# Patient Record
Sex: FEMALE | NOT HISPANIC OR LATINO | URBAN - METROPOLITAN AREA
[De-identification: names, ages, dates, MRNs, and addresses within clinical notes are randomized per-mention and may not be internally consistent; named-entity substitution may affect disease eponyms.]

---

## 2024-07-05 RX ORDER — AMOXICILLIN 500 MG/1
500 CAPSULE ORAL EVERY 12 HOURS
Qty: 20 CAPSULE | Refills: 0 | Status: SHIPPED | OUTPATIENT
Start: 2024-07-05 | End: 2024-07-15

## 2025-01-30 ENCOUNTER — OFFICE VISIT (OUTPATIENT)
Dept: SPORTS MEDICINE | Facility: CLINIC | Age: 35
End: 2025-01-30
Payer: COMMERCIAL

## 2025-01-30 ENCOUNTER — HOSPITAL ENCOUNTER (OUTPATIENT)
Dept: RADIOLOGY | Facility: HOSPITAL | Age: 35
Discharge: HOME | End: 2025-01-30
Attending: STUDENT IN AN ORGANIZED HEALTH CARE EDUCATION/TRAINING PROGRAM
Payer: COMMERCIAL

## 2025-01-30 VITALS — DIASTOLIC BLOOD PRESSURE: 86 MMHG | WEIGHT: 205.38 LBS | SYSTOLIC BLOOD PRESSURE: 122 MMHG | HEART RATE: 84 BPM

## 2025-01-30 DIAGNOSIS — M25.561 RIGHT KNEE PAIN, UNSPECIFIED CHRONICITY: ICD-10-CM

## 2025-01-30 DIAGNOSIS — M25.461 EFFUSION OF RIGHT KNEE JOINT: Primary | ICD-10-CM

## 2025-01-30 PROCEDURE — 99204 OFFICE O/P NEW MOD 45 MIN: CPT | Mod: S$GLB,,, | Performed by: STUDENT IN AN ORGANIZED HEALTH CARE EDUCATION/TRAINING PROGRAM

## 2025-01-30 PROCEDURE — 99999 PR PBB SHADOW E&M-EST. PATIENT-LVL III: CPT | Mod: PBBFAC,,, | Performed by: STUDENT IN AN ORGANIZED HEALTH CARE EDUCATION/TRAINING PROGRAM

## 2025-01-30 PROCEDURE — 3074F SYST BP LT 130 MM HG: CPT | Mod: CPTII,S$GLB,, | Performed by: STUDENT IN AN ORGANIZED HEALTH CARE EDUCATION/TRAINING PROGRAM

## 2025-01-30 PROCEDURE — 73564 X-RAY EXAM KNEE 4 OR MORE: CPT | Mod: TC,50

## 2025-01-30 PROCEDURE — 1159F MED LIST DOCD IN RCRD: CPT | Mod: CPTII,S$GLB,, | Performed by: STUDENT IN AN ORGANIZED HEALTH CARE EDUCATION/TRAINING PROGRAM

## 2025-01-30 PROCEDURE — 1160F RVW MEDS BY RX/DR IN RCRD: CPT | Mod: CPTII,S$GLB,, | Performed by: STUDENT IN AN ORGANIZED HEALTH CARE EDUCATION/TRAINING PROGRAM

## 2025-01-30 PROCEDURE — 3079F DIAST BP 80-89 MM HG: CPT | Mod: CPTII,S$GLB,, | Performed by: STUDENT IN AN ORGANIZED HEALTH CARE EDUCATION/TRAINING PROGRAM

## 2025-01-30 PROCEDURE — 73564 X-RAY EXAM KNEE 4 OR MORE: CPT | Mod: 26,,, | Performed by: RADIOLOGY

## 2025-01-31 NOTE — PROGRESS NOTES
Subjective:          Chief Complaint: Sania Sage is a 34 y.o. female who had concerns including Pain of the Right Knee.    History of Present Illness    CHIEF COMPLAINT:  - Right knee injury follow-up.    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 She initially experienced pain on the side of the knee with bending. After stopping running for 1 week and decreasing weight load, symptoms initially improved but returned in early December after performing front squats and wall pulls. She describes the sensation as abnormal and reports that her knee does not fully extend. There was initial swelling, but she is unsure if it persists. She has been wearing a brace, which is no longer effective. Client participated in a half marathon on the [place] prior to the injury. She denies any previous knee injuries or surgeries.    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 No pertinent past medical history.    Current Outpatient Medications on File Prior to Visit   Medication Sig Dispense Refill    moxifloxacin (VIGAMOX) 0.5 % ophthalmic solution PLACE 1 DROP INTO AFFECTED EYE(S) TWICE A DAY X 7 DAYS (Patient not taking: Reported on 1/30/2025) 3 mL 1     No current facility-administered medications on file prior to visit.       History reviewed. No pertinent surgical history.    No family history on file.    Social History     Socioeconomic History    Marital status: Single      Review of Systems   Constitutional: Negative.   HENT: Negative.     Eyes: Negative.    Cardiovascular:  Negative.    Respiratory: Negative.     Endocrine: Negative.    Hematologic/Lymphatic: Negative.    Skin: Negative.    Musculoskeletal:  Positive for joint pain, joint swelling and stiffness.   Neurological: Negative.    Psychiatric/Behavioral: Negative.     Allergic/Immunologic: Negative.                  Objective:        General: Sania is well-developed, well-nourished, appears stated age, in no acute distress, alert and oriented to time, place and person.     General    Nursing note and vitals reviewed.  Constitutional: She is oriented to person, place, and time. She appears well-developed and well-nourished. No distress.   HENT:   Head: Normocephalic and atraumatic.   Nose: Nose normal.   Eyes: EOM are normal.   Cardiovascular:  Intact distal pulses.            Pulmonary/Chest: Effort normal. No respiratory distress.   Neurological: She is alert and oriented to person, place, and time.   Psychiatric: She has a normal mood and affect. Her behavior is normal. Judgment and thought content normal.     General Musculoskeletal Exam   Gait: normal       Right Knee Exam     Inspection   Erythema: absent  Scars: absent  Swelling: present  Effusion: present  Deformity: absent  Bruising: absent    Tenderness   The patient is tender to palpation of the medial joint line.    Range of Motion   Extension:  0   Flexion:  130     Tests   Meniscus   Urmila:  Medial - positive Lateral - negative  Ligament Examination   Lachman: normal (-1 to 2mm)   PCL-Posterior Drawer: normal (0 to 2mm)     MCL - Valgus: normal (0 to 2mm)  LCL - Varus: normal  Patella   Patellar apprehension: negative  Passive Patellar Tilt: neutral  Patellar Tracking: normal  Patellar Grind: negative    Other   Sensation: normal    Left Knee Exam     Inspection   Erythema: absent  Scars: absent  Swelling: absent  Effusion: absent  Deformity: absent  Bruising: absent    Tenderness   The patient is experiencing no tenderness.     Range of Motion   Extension:   -5   Flexion:  140     Tests   Meniscus   Urmila:  Medial - negative Lateral - negative  Stability   Lachman: normal (-1 to 2mm)   PCL-Posterior Drawer: normal (0 to 2mm)  MCL - Valgus: normal (0 to 2mm)  LCL - Varus: normal (0 to 2mm)  Patella   Patellar apprehension: negative  Passive Patellar Tilt: neutral  Patellar Tracking: normal  Patellar Grind: negative    Other   Sensation: normal    Muscle Strength   Right Lower Extremity   Quadriceps:  5/5   Hamstrin/5   Left Lower Extremity   Quadriceps:  5/5   Hamstrin/5     Vascular Exam     Right Pulses  Dorsalis Pedis:      2+  Posterior Tibial:      2+        Left Pulses  Dorsalis Pedis:      2+  Posterior Tibial:      2+        Edema  Right Lower Leg: absent  Left Lower Leg: absent      Imaging:   X-rays of the bilateral knees from 2025 personally viewed by me on that day these include weight-bearing AP, PA flexion lateral, Merchant views.  Joint space is well-maintained.  No fracture.  No bony abnormality.  No arthritic changes.      Assessment:   Sania Sage is a 34 y.o. female with right knee pain concerning for medial meniscal injury  1. Effusion of right knee joint  MRI Knee Without Contrast Right      2. Right knee pain, unspecified chronicity  X-ray Knee Ortho Bilateral with Flexion              Plan:         Assessment & Plan    IMAGING:  - Ordered MRI Knee    PROCEDURES:  - Discussed the MRI procedure with the patient, explaining that the results would help determine the next steps in treatment.    FOLLOW UP:  - Follow up after MRI         This note was generated with the assistance of ambient listening technology. Verbal consent was obtained by the patient and accompanying visitor(s) for the recording of patient appointment to facilitate this note. I attest to having reviewed and edited the generated note for accuracy, though some syntax or spelling errors may persist. Please contact the author of this note for any  clarification.

## 2025-02-06 ENCOUNTER — HOSPITAL ENCOUNTER (OUTPATIENT)
Dept: RADIOLOGY | Facility: HOSPITAL | Age: 35
Discharge: HOME | End: 2025-02-06
Attending: STUDENT IN AN ORGANIZED HEALTH CARE EDUCATION/TRAINING PROGRAM
Payer: COMMERCIAL

## 2025-02-06 ENCOUNTER — OFFICE VISIT (OUTPATIENT)
Dept: SPORTS MEDICINE | Facility: CLINIC | Age: 35
End: 2025-02-06
Payer: COMMERCIAL

## 2025-02-06 VITALS — WEIGHT: 206.81 LBS | SYSTOLIC BLOOD PRESSURE: 122 MMHG | HEART RATE: 67 BPM | DIASTOLIC BLOOD PRESSURE: 82 MMHG

## 2025-02-06 DIAGNOSIS — M25.461 EFFUSION OF RIGHT KNEE JOINT: ICD-10-CM

## 2025-02-06 DIAGNOSIS — M25.461 EFFUSION OF RIGHT KNEE JOINT: Primary | ICD-10-CM

## 2025-02-06 DIAGNOSIS — M67.51 PLICA OF KNEE, RIGHT: ICD-10-CM

## 2025-02-06 PROCEDURE — 1159F MED LIST DOCD IN RCRD: CPT | Mod: CPTII,S$GLB,, | Performed by: STUDENT IN AN ORGANIZED HEALTH CARE EDUCATION/TRAINING PROGRAM

## 2025-02-06 PROCEDURE — 73721 MRI JNT OF LWR EXTRE W/O DYE: CPT | Mod: 26,RT,, | Performed by: RADIOLOGY

## 2025-02-06 PROCEDURE — 1160F RVW MEDS BY RX/DR IN RCRD: CPT | Mod: CPTII,S$GLB,, | Performed by: STUDENT IN AN ORGANIZED HEALTH CARE EDUCATION/TRAINING PROGRAM

## 2025-02-06 PROCEDURE — 73721 MRI JNT OF LWR EXTRE W/O DYE: CPT | Mod: TC,RT

## 2025-02-06 PROCEDURE — 99214 OFFICE O/P EST MOD 30 MIN: CPT | Mod: S$GLB,,, | Performed by: STUDENT IN AN ORGANIZED HEALTH CARE EDUCATION/TRAINING PROGRAM

## 2025-02-06 PROCEDURE — 3079F DIAST BP 80-89 MM HG: CPT | Mod: CPTII,S$GLB,, | Performed by: STUDENT IN AN ORGANIZED HEALTH CARE EDUCATION/TRAINING PROGRAM

## 2025-02-06 PROCEDURE — 99999 PR PBB SHADOW E&M-EST. PATIENT-LVL III: CPT | Mod: PBBFAC,,, | Performed by: STUDENT IN AN ORGANIZED HEALTH CARE EDUCATION/TRAINING PROGRAM

## 2025-02-06 PROCEDURE — 3074F SYST BP LT 130 MM HG: CPT | Mod: CPTII,S$GLB,, | Performed by: STUDENT IN AN ORGANIZED HEALTH CARE EDUCATION/TRAINING PROGRAM

## 2025-02-06 RX ORDER — MELOXICAM 15 MG/1
15 TABLET ORAL DAILY
Qty: 30 TABLET | Refills: 2 | Status: SHIPPED | OUTPATIENT
Start: 2025-02-06 | End: 2025-05-07

## 2025-02-06 NOTE — PROGRESS NOTES
Subjective:          Chief Complaint: Sania Sage is a 34 y.o. female who had concerns including Pain of the Right Knee.    HPI 02/06/2025  History of Present Illness    CHIEF COMPLAINT:  - Right knee pain follow-up    HPI:  Client presents for follow-up of right knee pain. She reports improvement, stating the knee is not as curved. She confirms she can fully straighten her knee. Right knee pain started approximately two weeks ago. She describes occasional unusual sensations in the knee. She has been using Voltaren gel for treatment and continued going to the gym, doing modified squats, noting improvement in her condition. She inquires about appropriate activity levels, specifically regarding running.    She denies catching, clicking, or snapping sensations in the knee. She also denies taking oral anti-inflammatory medications such as ibuprofen or naproxen.    PREVIOUS TREATMENTS:  - Voltaren gel: Client has been using this topically  - Modified squats: Client has been performing these at the gym, noting improvement in condition           HPI 1/30/2025  History of Present Illness    CHIEF COMPLAINT:  - Right knee injury follow-up.    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 She initially experienced pain on the side of the knee with bending. After stopping running for 1 week and decreasing weight load, symptoms initially improved but returned in early December after performing front squats and wall pulls. She describes the sensation as abnormal and reports that her knee does not fully extend. There was initial swelling, but she is unsure if it persists. She has been wearing a brace, which is no longer effective. Client participated in a half marathon on the [place] prior to the injury. She denies any previous knee injuries or surgeries.    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 No pertinent past medical history.    Current Outpatient Medications on File Prior to Visit   Medication Sig Dispense Refill    moxifloxacin (VIGAMOX) 0.5 % ophthalmic solution PLACE 1 DROP INTO AFFECTED EYE(S) TWICE A DAY X 7 DAYS (Patient not taking: Reported on 1/30/2025) 3 mL 1     No current facility-administered medications on file prior to visit.       History reviewed. No pertinent surgical history.    No family history on file.    Social History     Socioeconomic History    Marital status: Single      Review of Systems   Constitutional: Negative.   HENT: Negative.     Eyes: Negative.    Cardiovascular: Negative.    Respiratory: Negative.     Endocrine: Negative.    Hematologic/Lymphatic: Negative.    Skin: Negative.    Musculoskeletal:  Positive for joint pain, joint swelling and stiffness.   Neurological: Negative.    Psychiatric/Behavioral: Negative.     Allergic/Immunologic: Negative.        Pain Related Questions  Over the past 3 days, what was your average pain during activity? (I.e. running, jogging, walking, climbing stairs, getting dressed, ect.): 1  Over the past 3 days, what was your highest pain level?: 2  Over the past 3 days, what was your lowest pain level? : 0    Other  How many nights a week are you awakened by your affected body part?: 0  Was the patient's WEIGHT measured or patient reported?: Measured      Objective:        General: Sania is well-developed, well-nourished, appears stated age, in no acute distress, alert and oriented to time, place and person.     General    Nursing note and vitals reviewed.  Constitutional: She is oriented to person, place, and time. She  appears well-developed and well-nourished. No distress.   HENT:   Head: Normocephalic and atraumatic.   Nose: Nose normal.   Eyes: EOM are normal.   Cardiovascular:  Intact distal pulses.            Pulmonary/Chest: Effort normal. No respiratory distress.   Neurological: She is alert and oriented to person, place, and time.   Psychiatric: She has a normal mood and affect. Her behavior is normal. Judgment and thought content normal.     General Musculoskeletal Exam   Gait: normal       Right Knee Exam     Inspection   Erythema: absent  Scars: absent  Swelling: present  Effusion: present  Deformity: absent  Bruising: absent    Tenderness   The patient is tender to palpation of the medial joint line.    Range of Motion   Extension:  0   Flexion:  130     Tests   Meniscus   Urmila:  Medial - positive Lateral - negative  Ligament Examination   Lachman: normal (-1 to 2mm)   PCL-Posterior Drawer: normal (0 to 2mm)     MCL - Valgus: normal (0 to 2mm)  LCL - Varus: normal  Patella   Patellar apprehension: negative  Passive Patellar Tilt: neutral  Patellar Tracking: normal  Patellar Grind: negative    Other   Sensation: normal    Left Knee Exam     Inspection   Erythema: absent  Scars: absent  Swelling: absent  Effusion: absent  Deformity: absent  Bruising: absent    Tenderness   The patient is experiencing no tenderness.     Range of Motion   Extension:  -5   Flexion:  140     Tests   Meniscus   Urmila:  Medial - negative Lateral - negative  Stability   Lachman: normal (-1 to 2mm)   PCL-Posterior Drawer: normal (0 to 2mm)  MCL - Valgus: normal (0 to 2mm)  LCL - Varus: normal (0 to 2mm)  Patella   Patellar apprehension: negative  Passive Patellar Tilt: neutral  Patellar Tracking: normal  Patellar Grind: negative    Other   Sensation: normal    Muscle Strength   Right Lower Extremity   Quadriceps:  5/5   Hamstrin/5   Left Lower Extremity   Quadriceps:  5/5   Hamstrin/5     Vascular Exam     Right Pulses  Dorsalis  Pedis:      2+  Posterior Tibial:      2+        Left Pulses  Dorsalis Pedis:      2+  Posterior Tibial:      2+        Edema  Right Lower Leg: absent  Left Lower Leg: absent        Imaging:   X-rays of the bilateral knees from 01/30/2025 personally viewed by me on that day these include weight-bearing AP, PA flexion lateral, Merchant views.  Joint space is well-maintained.  No fracture.  No bony abnormality.  No arthritic changes.      MRI of the right knee from 02/06/2025 personally viewed by me 02/06/2025.  The cruciate and collateral ligaments are intact, however there is some straightening of the ACL but overall intact.  Lateral meniscus appears intact.  Medial meniscus has some in his increased signal in the posterior horn but no obvious tear.  There is a joint effusion with synovitis.  Cartilage appears preserved in all 3 compartments      Assessment:   Sania Sage is a 34 y.o. female with right knee pain concerning for medial meniscal injury  1. Effusion of right knee joint                  Plan:         Assessment & Plan    MEDICATIONS:  - Started Mobic (meloxicam) daily with food.    REFERRALS:  - Referred to physical therapy at [facility] near [employer] in [place] for knee strengthening exercises.    PROCEDURES:  - Reviewed MRI results with patient, explaining findings in detail including meniscus, ACL, PCL, cartilage, and ligaments.  - Discussed potential future arthroscopy if conservative treatments don't help.    FOLLOW UP:  - Follow up in 8 weeks.    PATIENT INSTRUCTIONS:  - Avoid high-impact activities for 2-3 weeks.  - Resume activities as tolerated after 2-3 weeks.         This note was generated with the assistance of ambient listening technology. Verbal consent was obtained by the patient and accompanying visitor(s) for the recording of patient appointment to facilitate this note. I attest to having reviewed and edited the generated note for accuracy, though some syntax or spelling errors  may persist. Please contact the author of this note for any clarification.

## 2025-02-18 ENCOUNTER — CLINICAL SUPPORT (OUTPATIENT)
Dept: REHABILITATION | Facility: HOSPITAL | Age: 35
End: 2025-02-18
Attending: STUDENT IN AN ORGANIZED HEALTH CARE EDUCATION/TRAINING PROGRAM
Payer: COMMERCIAL

## 2025-02-18 DIAGNOSIS — M25.461 EFFUSION OF RIGHT KNEE JOINT: ICD-10-CM

## 2025-02-18 DIAGNOSIS — M67.51 PLICA OF KNEE, RIGHT: ICD-10-CM

## 2025-02-18 DIAGNOSIS — Z74.09 DECREASED FUNCTIONAL MOBILITY AND ENDURANCE: ICD-10-CM

## 2025-02-18 DIAGNOSIS — R29.3 POSTURAL IMBALANCE: Primary | ICD-10-CM

## 2025-02-18 PROCEDURE — 97161 PT EVAL LOW COMPLEX 20 MIN: CPT

## 2025-02-18 NOTE — PROGRESS NOTES
"  Outpatient Rehab    Physical Therapy Evaluation (only)+ Treatment    Patient Name: Sania Sage  MRN: [id number]  YOB: 1990  Today's Date: 2/21/2025    Therapy Diagnosis:   Encounter Diagnoses   Name Primary?    Effusion of right knee joint     Plica of knee, right     Postural imbalance Yes    Decreased functional mobility and endurance      Physician: Jonny Rowley MD    Physician Orders: Eval and Treat  Medical Diagnosis: M25.461 (ICD-10-CM) - Effusion of right knee joint M67.51 (ICD-10-CM) - Plica of knee, right    Visit # / Visits Authorized:  0 / 20   Date of Evaluation:  2/18/2025   Insurance Authorization Period: 02/06/2025  to 02/06/2026  Plan of Care Certification:  2/18/2025 to 4/22/2025      Time In: 1500   Time Out: 1600  Total Time: 60   Total Billable Time: 60    Intake Outcome Measure for FOTO Survey    Therapist reviewed FOTO scores for Sania Sage on 2/18/2025.   FOTO report - see Media section or FOTO account episode details.     Intake Score:  (see epic)%    Precautions     Standard      Subjective   History of Present Illness  Sania is a 34 y.o. female who reports to physical therapy with a chief concern of R knee pain. According to the patient's chart, Sania has no past medical history on file. Sania has no past surgical history on file.                History of Present Condition/Illness: Per MD 2/6 "Client presents for follow-up of right knee pain. She reports improvement, stating the knee is not as curved. She confirms she can fully straighten her knee. Right knee pain started approximately two weeks ago. She describes occasional unusual sensations in the knee. She has been using Voltaren gel for treatment and continued going to the gym, doing modified squats, noting improvement in her condition. She inquires about appropriate activity levels, specifically regarding running. She denies catching, clicking, or snapping sensations in the " "knee. She also denies taking oral anti-inflammatory medications such as ibuprofen or naproxen. 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 Pt has noticed improvement since then but she continues to have pain with squating, jogging and she reports that she recently slipped in the rain a little making her knee feel "off" pt states minimal swelling occurs when joint becomes irritated but she does note swelling after increased activity. She continues to perform crossfit workouts but modifies the excercises she performs.    Pain     Patient reports a current pain level of 4/10. Pain at best is reported as 2/10. Pain at worst is reported as 6/10.   Clinical Progression (since onset): Improved  Pain Qualities: Discomfort  Pain-Relieving Factors: Activity modification, Other (Comment)  Other Pain-Relieving Factors: Icy hot  Pain-Aggravating Factors: Bending, Kneeling, Exercise  Kne extension        Past Medical History/Physical Systems Review:   Sania Munsongado Sage  has no past medical history on file.    Sania Sage  has no past surgical history on file.    Sania has a current medication list which includes the following prescription(s): meloxicam and moxifloxacin.    Review of patient's allergies indicates:  No Known Allergies     Objective   Posture                 Bilaterally, knee position is: Genu Valgus       Knee Observations  Right Knee Observations  Present: Straight Leg Raise Extensor Lag (Due to pain)  Left Knee Observations  Not Present: Straight Leg Raise Extensor Lag             Hip Range of Motion   Right Hip   Active (deg) Passive (deg) Pain   Flexion 115       Extension         ABduction         ADduction         External Rotation 90/90 40       External Rotation Prone         Internal Rotation 90/90 10       Internal Rotation Prone             Left Hip   Active (deg) Passive (deg) Pain   Flexion 115  "      Extension         ABduction         ADduction         External Rotation 90/90         External Rotation Prone         Internal Rotation 90/90 20       Internal Rotation Prone                  Knee Range of Motion   Right Knee   Active (deg) Passive (deg) Pain   Flexion 120 130  (No pain but hesitation noted)   Extension -2 1 Yes       Left Knee   Active (deg) Passive (deg) Pain   Flexion 140 140     Extension 2 3         Decreased tibial IR on R knee compared to L.              Hip Strength - Planes of Motion   Right Strength Right Pain Left Strength Left  Pain   Flexion (L2)           Extension           ABduction           ADduction           Internal Rotation 4-   4+     External Rotation 3   4-         Knee Strength   Right Strength Right Pain Left Strength Left  Pain   Flexion (S2)           Prone Flexion           Extension (L3) 4   5       Knee Extensor Lag  Lag Present: Right (Due to pain)  No Lag: Left           Knee Special Tests  Knee Ligament Tests  Negative: Right Anterior Drawer, Left Anterior Drawer, Right Lachman, Left Lachman, Right Posterior Drawer, and Left Posterior Drawer  Negative: Right Valgus Stress at 0 Degrees, Left Valgus Stress at 0 Degrees, Right Varus Stress at 0 Degrees, Left Varus Stress at 0 Degrees, Right Valgus Stress at 30 Degrees, Left Valgus Stress at 30 Degrees, Left Varus Stress at 30 Degrees, and Right Varus Stress at 30 Degrees  Pt reports lateral knee pain that is familiar with valgus stress at 0 and 30               Knee Patellar Screening       Right Patellar Mobility  Hypomobile: Lateral  Left Patellar Mobility  Normal: Lateral              Squat Testing  The patient completed 4 squat repetitions.  Observations  Right Side: Pain  Left Side: Trunk Lean, Sitting Towards Side, and Anterior Tibial Translation Beyond Toes     Pt reports improvement in pain when heels elevated with squat. Pt unable to correct left lateral shift with vc during evaluation    Single Leg  Squat Testing - Right Leg  The patient completed 2 squat repetitions on the right leg.   Observations: Pain          Pain reported 10 deg inro squat and decreased range attemptes    Single Leg Squat Testing - Left Leg  The patient completed 2 squat repetitions on the left leg.           Knee angle achieved 50 deg       Gait Analysis  Hip Observations During Gait  Right: Excessive Femoral Internal Rotation  Knee Observations During Gait  Right: Decreased Knee Extension in Initial Contact and Knee Decreased Extension in Midstance       Treatment Provided:  Therapeutic Exercise-   Quad sets for HEP with proper technique education and education on significance of restoring full extension  Side lying ER- 1x10    Assessment & Plan   Assessment  Sania presents with a condition of Low complexity.   Presentation of Symptoms: Stable  Will Comorbidities Impact Care: Yes       Functional Limitations: Activity tolerance, Functional mobility  Impairments: Activity intolerance, Impaired physical strength, Pain with functional activity  Personal Factors Affecting Prognosis: Schedule, Pain    Prognosis: Fair  Assessment Details: Pt is a 35 y/o female with a medical dx of M25.461 (ICD-10-CM) - Effusion of right knee joint M67.51 (ICD-10-CM) - Plica of knee, right. Pt's MRI reveals a lateral meniscus tear of R knee and hx in conjunction with pt's objective measures support this finding. Significant findings include decreased range, strength, and joint mobility of R knee in conjunction with decreased control of various dynamic activities. Pt is limited with functional mobility and this condition limits her from being able to fully participate in work and recreational activities. Pt will benefit from out patient PT to improve her dysfunctions and return pt to prior level of function.     Plan  From a physical therapy perspective, the patient would benefit from: Skilled Rehab Services    Planned therapy interventions include:  Therapeutic exercise, Therapeutic activities, Neuromuscular re-education, and Manual therapy.            Visit Frequency: 2 times Per Week for 8 Weeks.       This plan was discussed with Patient.   Discussion participants: Agreed Upon Plan of Care             Patient's spiritual, cultural, and educational needs considered and patient agreeable to plan of care and goals.     Education  Education was done with Patient. The patient's learning style includes Demonstration and Listening. The patient Demonstrates understanding.         HEP, POC       Goals:   Active       Long       1.Report decreased knee pain < / = 2/10  to increase tolerance for returning to jogging        Start:  02/21/25    Expected End:  04/18/25            2.Patient goal: eturn to jogging and crossfit with no knee pain         Start:  02/21/25    Expected End:  04/18/25            3.Increase strength to >/= 4+/5 in hip abduction to increase tolerance for ADL and work activities.        Start:  02/21/25    Expected End:  04/18/25            4. Pt will report at CJ level (20-40% impaired) on FOTO knee to demonstrate increase in LE function with every day tasks.        Start:  02/21/25    Expected End:  04/18/25               Short       1.Report decreased knee pain  < / =  4/10  to increase tolerance for stairs at work        Start:  02/21/25    Expected End:  03/21/25            2. Increase knee ROM to opposite knee in order to be able to perform ADLs without difficulty.        Start:  02/21/25    Expected End:  03/21/25            3. Increase strength by 1/3 MMT grade in knee ext  to increase tolerance for ADL and work activities.       Start:  02/21/25    Expected End:  03/21/25             4. Pt to tolerate HEP to improve ROM and independence with ADL's        Start:  02/21/25    Expected End:  03/21/25                Angle Bhatti PT    This treatment was co-treated with,    Lindsay Contreras, PT, DPT  Board Certified in Orthopedic Physical  Therapy

## 2025-02-20 PROBLEM — Z74.09 DECREASED FUNCTIONAL MOBILITY AND ENDURANCE: Status: ACTIVE | Noted: 2025-02-20

## 2025-02-20 PROBLEM — R29.3 POSTURAL IMBALANCE: Status: ACTIVE | Noted: 2025-02-20

## 2025-02-21 ENCOUNTER — CLINICAL SUPPORT (OUTPATIENT)
Dept: REHABILITATION | Facility: HOSPITAL | Age: 35
End: 2025-02-21
Attending: STUDENT IN AN ORGANIZED HEALTH CARE EDUCATION/TRAINING PROGRAM
Payer: COMMERCIAL

## 2025-02-21 DIAGNOSIS — M67.51 PLICA OF KNEE, RIGHT: ICD-10-CM

## 2025-02-21 DIAGNOSIS — Z74.09 DECREASED FUNCTIONAL MOBILITY AND ENDURANCE: ICD-10-CM

## 2025-02-21 DIAGNOSIS — R29.3 POSTURAL IMBALANCE: ICD-10-CM

## 2025-02-21 DIAGNOSIS — M25.461 EFFUSION OF RIGHT KNEE JOINT: Primary | ICD-10-CM

## 2025-02-21 PROCEDURE — 97112 NEUROMUSCULAR REEDUCATION: CPT | Performed by: PHYSICAL THERAPIST

## 2025-02-21 PROCEDURE — 97110 THERAPEUTIC EXERCISES: CPT | Performed by: PHYSICAL THERAPIST

## 2025-02-21 PROCEDURE — 97140 MANUAL THERAPY 1/> REGIONS: CPT | Performed by: PHYSICAL THERAPIST

## 2025-02-21 NOTE — PROGRESS NOTES
"  Outpatient Rehab    Physical Therapy Evaluation (only)+ Treatment    Patient Name: Sania Sage  MRN: [id number]  YOB: 1990  Today's Date: 2/21/2025    Therapy Diagnosis:   Encounter Diagnoses   Name Primary?    Effusion of right knee joint     Plica of knee, right     Postural imbalance Yes    Decreased functional mobility and endurance      Physician: Jonny Rowley MD    Physician Orders: Eval and Treat  Medical Diagnosis: M25.461 (ICD-10-CM) - Effusion of right knee joint M67.51 (ICD-10-CM) - Plica of knee, right    Visit # / Visits Authorized:  0 / 20   Date of Evaluation:  2/18/2025   Insurance Authorization Period: 02/06/2025  to 02/06/2026  Plan of Care Certification:  2/18/2025 to 4/22/2025      Time In: 1500   Time Out: 1600  Total Time: 60   Total Billable Time: 60    Intake Outcome Measure for FOTO Survey    Therapist reviewed FOTO scores for Sania Sage on 2/18/2025.   FOTO report - see Media section or FOTO account episode details.     Intake Score:  (see epic)%    Precautions     Standard      Subjective   History of Present Illness  Sania is a 34 y.o. female who reports to physical therapy with a chief concern of R knee pain. According to the patient's chart, Sania has no past medical history on file. Sania has no past surgical history on file.                History of Present Condition/Illness: Per MD 2/6 "Client presents for follow-up of right knee pain. She reports improvement, stating the knee is not as curved. She confirms she can fully straighten her knee. Right knee pain started approximately two weeks ago. She describes occasional unusual sensations in the knee. She has been using Voltaren gel for treatment and continued going to the gym, doing modified squats, noting improvement in her condition. She inquires about appropriate activity levels, specifically regarding running. She denies catching, clicking, or snapping sensations in the " "knee. She also denies taking oral anti-inflammatory medications such as ibuprofen or naproxen. 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 Pt has noticed improvement since then but she continues to have pain with squating, jogging and she reports that she recently slipped in the rain a little making her knee feel "off" pt states minimal swelling occurs when joint becomes irritated but she does note swelling after increased activity. She continues to perform crossfit workouts but modifies the excercises she performs.    Pain     Patient reports a current pain level of 4/10. Pain at best is reported as 2/10. Pain at worst is reported as 6/10.   Clinical Progression (since onset): Improved  Pain Qualities: Discomfort  Pain-Relieving Factors: Activity modification, Other (Comment)  Other Pain-Relieving Factors: Icy hot  Pain-Aggravating Factors: Bending, Kneeling, Exercise  Kne extension        Past Medical History/Physical Systems Review:   Sania Munsongado Sage  has no past medical history on file.    Sania Sage  has no past surgical history on file.    Sania has a current medication list which includes the following prescription(s): meloxicam and moxifloxacin.    Review of patient's allergies indicates:  No Known Allergies     Objective   Posture                 Bilaterally, knee position is: Genu Valgus       Knee Observations  Right Knee Observations  Present: Straight Leg Raise Extensor Lag (Due to pain)  Left Knee Observations  Not Present: Straight Leg Raise Extensor Lag             Hip Range of Motion   Right Hip   Active (deg) Passive (deg) Pain   Flexion 115       Extension         ABduction         ADduction         External Rotation 90/90 40       External Rotation Prone         Internal Rotation 90/90 10       Internal Rotation Prone             Left Hip   Active (deg) Passive (deg) Pain   Flexion 115  "      Extension         ABduction         ADduction         External Rotation 90/90         External Rotation Prone         Internal Rotation 90/90 20       Internal Rotation Prone                  Knee Range of Motion   Right Knee   Active (deg) Passive (deg) Pain   Flexion 120 130  (No pain but hesitation noted)   Extension -2 1 Yes       Left Knee   Active (deg) Passive (deg) Pain   Flexion 140 140     Extension 2 3         Decreased tibial IR on R knee compared to L.              Hip Strength - Planes of Motion   Right Strength Right Pain Left Strength Left  Pain   Flexion (L2)           Extension           ABduction           ADduction           Internal Rotation 4-   4+     External Rotation 3   4-         Knee Strength   Right Strength Right Pain Left Strength Left  Pain   Flexion (S2)           Prone Flexion           Extension (L3) 4   5       Knee Extensor Lag  Lag Present: Right (Due to pain)  No Lag: Left           Knee Special Tests  Knee Ligament Tests  Negative: Right Anterior Drawer, Left Anterior Drawer, Right Lachman, Left Lachman, Right Posterior Drawer, and Left Posterior Drawer  Negative: Right Valgus Stress at 0 Degrees, Left Valgus Stress at 0 Degrees, Right Varus Stress at 0 Degrees, Left Varus Stress at 0 Degrees, Right Valgus Stress at 30 Degrees, Left Valgus Stress at 30 Degrees, Left Varus Stress at 30 Degrees, and Right Varus Stress at 30 Degrees  Pt reports lateral knee pain that is familiar with valgus stress at 0 and 30               Knee Patellar Screening       Right Patellar Mobility  Hypomobile: Lateral  Left Patellar Mobility  Normal: Lateral              Squat Testing  The patient completed 4 squat repetitions.  Observations  Right Side: Pain  Left Side: Trunk Lean, Sitting Towards Side, and Anterior Tibial Translation Beyond Toes     Pt reports improvement in pain when heels elevated with squat. Pt unable to correct left lateral shift with vc during evaluation    Single Leg  Squat Testing - Right Leg  The patient completed 2 squat repetitions on the right leg.   Observations: Pain          Pain reported 10 deg inro squat and decreased range attemptes    Single Leg Squat Testing - Left Leg  The patient completed 2 squat repetitions on the left leg.           Knee angle achieved 50 deg       Gait Analysis  Hip Observations During Gait  Right: Excessive Femoral Internal Rotation  Knee Observations During Gait  Right: Decreased Knee Extension in Initial Contact and Knee Decreased Extension in Midstance       Treatment Provided:  Therapeutic Exercise-   Quad sets for HEP with proper technique education and education on significance of restoring full extension  Side lying ER- 1x10    Assessment & Plan   Assessment  Sania presents with a condition of Low complexity.   Presentation of Symptoms: Stable  Will Comorbidities Impact Care: Yes       Functional Limitations: Activity tolerance, Functional mobility  Impairments: Activity intolerance, Impaired physical strength, Pain with functional activity  Personal Factors Affecting Prognosis: Schedule, Pain    Prognosis: Fair  Assessment Details: Pt is a 35 y/o female with a medical dx of M25.461 (ICD-10-CM) - Effusion of right knee joint M67.51 (ICD-10-CM) - Plica of knee, right. Pt's MRI reveals a lateral meniscus tear of R knee and hx in conjunction with pt's objective measures support this finding. Significant findings include decreased range, strength, and joint mobility of R knee in conjunction with decreased control of various dynamic activities. Pt is limited with functional mobility and this condition limits her from being able to fully participate in work and recreational activities. Pt will benefit from out patient PT to improve her dysfunctions and return pt to prior level of function.     Plan  From a physical therapy perspective, the patient would benefit from: Skilled Rehab Services    Planned therapy interventions include:  Therapeutic exercise, Therapeutic activities, Neuromuscular re-education, and Manual therapy.            Visit Frequency: 2 times Per Week for 8 Weeks.       This plan was discussed with Patient.   Discussion participants: Agreed Upon Plan of Care             Patient's spiritual, cultural, and educational needs considered and patient agreeable to plan of care and goals.     Education  Education was done with Patient. The patient's learning style includes Demonstration and Listening. The patient Demonstrates understanding.         HEP, POC       Goals:   Active       Long       1.Report decreased knee pain < / = 2/10  to increase tolerance for returning to jogging        Start:  02/21/25    Expected End:  04/18/25            2.Patient goal: eturn to jogging and crossfit with no knee pain         Start:  02/21/25    Expected End:  04/18/25            3.Increase strength to >/= 4+/5 in hip abduction to increase tolerance for ADL and work activities.        Start:  02/21/25    Expected End:  04/18/25            4. Pt will report at CJ level (20-40% impaired) on FOTO knee to demonstrate increase in LE function with every day tasks.        Start:  02/21/25    Expected End:  04/18/25               Short       1.Report decreased knee pain  < / =  4/10  to increase tolerance for stairs at work        Start:  02/21/25    Expected End:  03/21/25            2. Increase knee ROM to opposite knee in order to be able to perform ADLs without difficulty.        Start:  02/21/25    Expected End:  03/21/25            3. Increase strength by 1/3 MMT grade in knee ext  to increase tolerance for ADL and work activities.       Start:  02/21/25    Expected End:  03/21/25             4. Pt to tolerate HEP to improve ROM and independence with ADL's        Start:  02/21/25    Expected End:  03/21/25                Angel Bhatti PT    This treatment was co-treated with,    Lindsay Contreras, PT, DPT  Board Certified in Orthopedic Physical  Therapy

## 2025-02-21 NOTE — PROGRESS NOTES
"  Outpatient Rehab    Physical Therapy Evaluation    Patient Name: Sania Sage  MRN: [id number]  YOB: 1990  Today's Date: 2/28/2025    Therapy Diagnosis:   Encounter Diagnoses   Name Primary?    Effusion of right knee joint Yes    Plica of knee, right     Postural imbalance     Decreased functional mobility and endurance      Physician: Jonny Rowley MD    Physician Orders: Eval and Treat  Physician Orders: Eval and Treat  Medical Diagnosis: M25.461 (ICD-10-CM) - Effusion of right knee joint M67.51 (ICD-10-CM) - Plica of knee, right     Visit # / Visits Authorized:  1 / 10   Date of Evaluation:  2/18/2025   Insurance Authorization Period: 02/06/2025  to 02/06/2026  Plan of Care Certification:  2/18/2025 to 4/22/2025     Time In: 0700   Time Out: 0755  Total Time: 55   Total Billable Time: 55 minutes one oneone    Intake Outcome Measure for FOTO Survey    Therapist reviewed FOTO scores for Sania Sage on 2/21/2025.   FOTO report - see Media section or FOTO account episode details.     Intake Score:  %       Subjective Sania reports that she has been completing the exercises, and feels that her knee is "getting a little straighter."           Past Medical History/Physical Systems Review:   Sania Sage  has no past medical history on file.    Sania Sage  has no past surgical history on file.    Sania has a current medication list which includes the following prescription(s): meloxicam and moxifloxacin.    Review of patient's allergies indicates:  No Known Allergies     Objective            Treatment:  Therapeutic Exercise  Therapeutic Exercise Activity 1: Upright Bike, 7 minutes, Level 3    Manual Therapy  Manual Therapy Activity 1: Patellofemoral Mobilizations, superior glide  Manual Therapy Activity 2: TIbiofemoral Joint Posterior to Anterior Mobilizations  Manual Therapy Activity 3: Tibiofemoral Lateral Glides with Quadriceps " "Set    Balance/Neuromuscular Re-Education  Balance/Neuromuscular Re-Education Activity 1: Clamshells, red band, 3x8, 3" hold  Balance/Neuromuscular Re-Education Activity 2: Bridges, blue band, 2x12  Balance/Neuromuscular Re-Education Activity 3: Shuttle Double Leg Press, 75#  Balance/Neuromuscular Re-Education Activity 4: Shuttle Single Leg Press, 50# each leg, cueing for femoral abduction and external rotation  Balance/Neuromuscular Re-Education Activity 5: Hip Hinging with Dowel Giovanny, 15x         Assessment & Plan    Patient demonstrates considerable femoral internal rotation and adduction of the femur during double and single leg squat, with notable baseline valgus position in rest. Rachell stands lacking~15 degrees of knee extension and this is addressed and improved with manual therapy approximately 5 degrees.     Patient's spiritual, cultural, and educational needs considered and patient agreeable to plan of care and goals.           Goals:   Active       Long       1.Report decreased knee pain < / = 2/10  to increase tolerance for returning to jogging  (Adequate for Care Transition)       Start:  02/21/25    Expected End:  04/18/25            2.Patient goal: eturn to jogging and crossfit with no knee pain   (Adequate for Care Transition)       Start:  02/21/25    Expected End:  04/18/25            3.Increase strength to >/= 4+/5 in hip abduction to increase tolerance for ADL and work activities.  (Adequate for Care Transition)       Start:  02/21/25    Expected End:  04/18/25            4. Pt will report at CJ level (20-40% impaired) on FOTO knee to demonstrate increase in LE function with every day tasks.  (Adequate for Care Transition)       Start:  02/21/25    Expected End:  04/18/25               Short       1.Report decreased knee pain  < / =  4/10  to increase tolerance for stairs at work  (Adequate for Care Transition)       Start:  02/21/25    Expected End:  03/21/25            2. Increase knee ROM to " opposite knee in order to be able to perform ADLs without difficulty.  (Adequate for Care Transition)       Start:  02/21/25    Expected End:  03/21/25            3. Increase strength by 1/3 MMT grade in knee ext  to increase tolerance for ADL and work activities. (Adequate for Care Transition)       Start:  02/21/25    Expected End:  03/21/25             4. Pt to tolerate HEP to improve ROM and independence with ADL's  (Adequate for Care Transition)       Start:  02/21/25    Expected End:  03/21/25                Lindsay Contreras, PT DPT  Board Certified in Orthopedic Physical Therapy

## 2025-02-25 ENCOUNTER — CLINICAL SUPPORT (OUTPATIENT)
Dept: REHABILITATION | Facility: HOSPITAL | Age: 35
End: 2025-02-25
Attending: STUDENT IN AN ORGANIZED HEALTH CARE EDUCATION/TRAINING PROGRAM
Payer: COMMERCIAL

## 2025-02-25 DIAGNOSIS — R29.3 POSTURAL IMBALANCE: Primary | ICD-10-CM

## 2025-02-25 DIAGNOSIS — Z74.09 DECREASED FUNCTIONAL MOBILITY AND ENDURANCE: ICD-10-CM

## 2025-02-25 PROCEDURE — 97112 NEUROMUSCULAR REEDUCATION: CPT

## 2025-02-25 PROCEDURE — 97140 MANUAL THERAPY 1/> REGIONS: CPT

## 2025-02-25 PROCEDURE — 97110 THERAPEUTIC EXERCISES: CPT

## 2025-02-25 NOTE — PROGRESS NOTES
Outpatient Rehab    Physical Therapy Evaluation    Patient Name: Sania Sage  MRN: [id number]  YOB: 1990  Today's Date: 2/25/2025    Therapy Diagnosis:   Encounter Diagnoses   Name Primary?    Postural imbalance Yes    Decreased functional mobility and endurance        Physician: Jonny Rowley MD    Physician Orders: Eval and Treat   M25.461 (ICD-10-CM) - Effusion of right knee joint M67.51 (ICD-10-CM) - Plica of knee, right      Time In: 1703   Time Out: 1800  Total Time: 57   Total Billable Time: 57    Intake Outcome Measure for FOTO Survey    Therapist reviewed FOTO scores for Sania Sage on 2/25/2025.   FOTO report - see Media section or FOTO account episode details.     Intake Score:  %         Subjective      Pain     Patient reports a current pain level of 3/10.        Kne extension        Past Medical History/Physical Systems Review:   Sania Sage  has no past medical history on file.    Sania Sage  has no past surgical history on file.    Sania has a current medication list which includes the following prescription(s): meloxicam and moxifloxacin.    Review of patient's allergies indicates:  No Known Allergies     Objective            Treatment:  Therapeutic Exercise  Therapeutic Exercise Activity 1: Clams- 3x10 each side  Therapeutic Exercise Activity 3: TKE- 3x12 on R LE BTB  Therapeutic Exercise Activity 5: DF mobilizations- 30 reps  Therapeutic Exercise Activity 6: Modified squat range- 3x12 reps from modified range    Manual Therapy  Manual Therapy Activity 1: Fat pad mobilizations- 2 min  Manual Therapy Activity 2: PA glides of femur- grade 3 and 4  Manual Therapy Activity 3: Tibial IR MWM of R knee  Manual Therapy Activity 4: Lateral glides of femur on tibia  Manual Therapy Activity 5: talocrural HVLAT    Balance/Neuromuscular Re-Education  Balance/Neuromuscular Re-Education Activity 1: SL side lying shuttle press- 3x12- 2.5  bands  Balance/Neuromuscular Re-Education Activity 2: SIngle leg squat tap downs from 2 inch and 4 inch box- 2x8 each height  Balance/Neuromuscular Re-Education Activity 3: Banded side steps with RTB around forfoot- 3x5 yard laps         Assessment & Plan        Patient's spiritual, cultural, and educational needs considered and patient agreeable to plan of care and goals.           Goals:   Active       Long       1.Report decreased knee pain < / = 2/10  to increase tolerance for returning to jogging  (Adequate for Care Transition)       Start:  02/21/25    Expected End:  04/18/25            2.Patient goal: eturn to jogging and crossfit with no knee pain   (Adequate for Care Transition)       Start:  02/21/25    Expected End:  04/18/25            3.Increase strength to >/= 4+/5 in hip abduction to increase tolerance for ADL and work activities.  (Adequate for Care Transition)       Start:  02/21/25    Expected End:  04/18/25            4. Pt will report at CJ level (20-40% impaired) on FOTO knee to demonstrate increase in LE function with every day tasks.  (Adequate for Care Transition)       Start:  02/21/25    Expected End:  04/18/25               Short       1.Report decreased knee pain  < / =  4/10  to increase tolerance for stairs at work  (Adequate for Care Transition)       Start:  02/21/25    Expected End:  03/21/25            2. Increase knee ROM to opposite knee in order to be able to perform ADLs without difficulty.  (Adequate for Care Transition)       Start:  02/21/25    Expected End:  03/21/25            3. Increase strength by 1/3 MMT grade in knee ext  to increase tolerance for ADL and work activities. (Adequate for Care Transition)       Start:  02/21/25    Expected End:  03/21/25             4. Pt to tolerate HEP to improve ROM and independence with ADL's  (Adequate for Care Transition)       Start:  02/21/25    Expected End:  03/21/25                Angel Bhatti, PT

## 2025-02-27 ENCOUNTER — CLINICAL SUPPORT (OUTPATIENT)
Dept: REHABILITATION | Facility: HOSPITAL | Age: 35
End: 2025-02-27
Attending: STUDENT IN AN ORGANIZED HEALTH CARE EDUCATION/TRAINING PROGRAM
Payer: COMMERCIAL

## 2025-02-27 DIAGNOSIS — Z74.09 DECREASED FUNCTIONAL MOBILITY AND ENDURANCE: ICD-10-CM

## 2025-02-27 DIAGNOSIS — R29.3 POSTURAL IMBALANCE: Primary | ICD-10-CM

## 2025-02-27 PROCEDURE — 97110 THERAPEUTIC EXERCISES: CPT

## 2025-02-27 PROCEDURE — 97140 MANUAL THERAPY 1/> REGIONS: CPT

## 2025-02-27 PROCEDURE — 97112 NEUROMUSCULAR REEDUCATION: CPT

## 2025-02-27 NOTE — PROGRESS NOTES
Outpatient Rehab    Physical Therapy Visit    Patient Name: Sania Sage  MRN: [id number]  YOB: 1990  Encounter Date: 2/27/2025    Therapy Diagnosis:   Encounter Diagnoses   Name Primary?    Postural imbalance Yes    Decreased functional mobility and endurance      Physician: Jonny Rowley MD      Physician Orders: Eval and Treat  Medical Diagnosis: M25.461 (ICD-10-CM) - Effusion of right knee joint M67.51 (ICD-10-CM) - Plica of knee, right    Visit # / Visits Authorized:  0 / 20   Date of Evaluation:  2/18/2025   Insurance Authorization Period: 02/06/2025  to 02/06/2026  Plan of Care Certification:  2/18/2025 to 4/22/2025      Time In:     Time Out:    Total Time:     Total Billable Time: 60    FOTO:  Intake Score:  %  Survey Score 1:  %  Survey Score 2:  %         Subjective   Felt great after last session and went to the gym. She was a little sore after the gym but that because she tried to single leg squat from a 12 inch box..         Objective            Treatment:  Therapeutic Exercise  Therapeutic Exercise Activity 1: Clams- 3x10 each side  Therapeutic Exercise Activity 3: TKE- 3x12 on R LE BTB  Therapeutic Exercise Activity 5: DF mobilizations- 30 reps  Therapeutic Exercise Activity 6: Modified squat range- 3x12 reps from modified range    Manual Therapy  Manual Therapy Activity 1: Fat pad mobilizations- 2 min  Manual Therapy Activity 2: PA glides of femur- grade 3 and 4  Manual Therapy Activity 3: Tibial IR MWM of R knee  Manual Therapy Activity 4: Lateral glides of tib fem joint  Manual Therapy Activity 5: talocrural HVLAT    Balance/Neuromuscular Re-Education  Balance/Neuromuscular Re-Education Activity 1: SL side lying shuttle press- 3x12- 1.5 bands  Balance/Neuromuscular Re-Education Activity 2: 4 inch box 3x12  Balance/Neuromuscular Re-Education Activity 3: Banded side steps with RTB around forfoot- 3x5 yard laps         Assessment & Plan   Assessment: Pt arrived to PT  with atrict sign of       Patient will continue to benefit from skilled outpatient physical therapy to address the deficits listed in the problem list box on initial evaluation, provide pt/family education and to maximize pt's level of independence in the home and community environment.     Patient's spiritual, cultural, and educational needs considered and patient agreeable to plan of care and goals.           Plan:      Goals:   Active       Long       1.Report decreased knee pain < / = 2/10  to increase tolerance for returning to jogging  (Adequate for Care Transition)       Start:  02/21/25    Expected End:  04/18/25            2.Patient goal: eturn to jogging and crossfit with no knee pain   (Adequate for Care Transition)       Start:  02/21/25    Expected End:  04/18/25            3.Increase strength to >/= 4+/5 in hip abduction to increase tolerance for ADL and work activities.  (Adequate for Care Transition)       Start:  02/21/25    Expected End:  04/18/25            4. Pt will report at CJ level (20-40% impaired) on FOTO knee to demonstrate increase in LE function with every day tasks.  (Adequate for Care Transition)       Start:  02/21/25    Expected End:  04/18/25               Short       1.Report decreased knee pain  < / =  4/10  to increase tolerance for stairs at work  (Adequate for Care Transition)       Start:  02/21/25    Expected End:  03/21/25            2. Increase knee ROM to opposite knee in order to be able to perform ADLs without difficulty.  (Adequate for Care Transition)       Start:  02/21/25    Expected End:  03/21/25            3. Increase strength by 1/3 MMT grade in knee ext  to increase tolerance for ADL and work activities. (Adequate for Care Transition)       Start:  02/21/25    Expected End:  03/21/25             4. Pt to tolerate HEP to improve ROM and independence with ADL's  (Adequate for Care Transition)       Start:  02/21/25    Expected End:  03/21/25                Angel  Magy, PT

## 2025-03-03 ENCOUNTER — CLINICAL SUPPORT (OUTPATIENT)
Dept: REHABILITATION | Facility: HOSPITAL | Age: 35
End: 2025-03-03
Attending: STUDENT IN AN ORGANIZED HEALTH CARE EDUCATION/TRAINING PROGRAM
Payer: COMMERCIAL

## 2025-03-03 DIAGNOSIS — R29.3 POSTURAL IMBALANCE: Primary | ICD-10-CM

## 2025-03-03 DIAGNOSIS — Z74.09 DECREASED FUNCTIONAL MOBILITY AND ENDURANCE: ICD-10-CM

## 2025-03-03 PROCEDURE — 97112 NEUROMUSCULAR REEDUCATION: CPT

## 2025-03-03 PROCEDURE — 97110 THERAPEUTIC EXERCISES: CPT

## 2025-03-03 PROCEDURE — 97140 MANUAL THERAPY 1/> REGIONS: CPT

## 2025-03-03 NOTE — PROGRESS NOTES
Outpatient Rehab    Physical Therapy Visit    Patient Name: Sania Sage  MRN: [id number]  YOB: 1990  Encounter Date: 3/3/2025    Therapy Diagnosis:   Encounter Diagnoses   Name Primary?    Postural imbalance Yes    Decreased functional mobility and endurance        Physician: Jonny Rowley MD      Physician Orders: Eval and Treat  Medical Diagnosis: M25.461 (ICD-10-CM) - Effusion of right knee joint M67.51 (ICD-10-CM) - Plica of knee, right    Visit # / Visits Authorized:  0 / 20   Date of Evaluation:  2/18/2025   Insurance Authorization Period: 02/06/2025  to 02/06/2026  Plan of Care Certification:  2/18/2025 to 4/22/2025      Time In:     Time Out:    Total Time:     Total Billable Time: 60    FOTO:  Intake Score:  %  Survey Score 1:  %  Survey Score 2:  %         Subjective   Pt states she notices improvements through the day. No significant issues since last visit and she was able to take stairs wit no front knee pain..  Pain reported as 0/10. Kne extension    Objective            Treatment:  Therapeutic Exercise  Therapeutic Exercise Activity 1: Bike 7 min level 2  Therapeutic Exercise Activity 2: Side lying clams- 3x12  Therapeutic Exercise Activity 3: TKE- 3x12 on R LE BTB  Therapeutic Exercise Activity 5: DF mobilizations- 30 reps         Balance/Neuromuscular Re-Education  Balance/Neuromuscular Re-Education Activity 1: SL side lying shuttle press- 3x12- 1.5 bands  Balance/Neuromuscular Re-Education Activity 2: 4 inch box 3x12  Balance/Neuromuscular Re-Education Activity 3: Banded side steps with RTB around forfoot- 3x5 yard laps  Balance/Neuromuscular Re-Education Activity 4: Shuttle Single Leg Press, 50# each leg, cueing for femoral abduction and external rotation  Balance/Neuromuscular Re-Education Activity 5: Hip Hinging with Dowel Giovanny, 2x15  Balance/Neuromuscular Re-Education Activity 6: 4 rounds of patellar tendonopathy protocol and education about protocol.          Assessment & Plan   Assessment: Pt with objective improvements noted by decreased pain scores. Pt reports patellar tenderness during squat and we applied tendon isometrics to determine if this would benefit pt and she reports slight improvement in symptoms with squat. This signifies possible improvement with tendon protocol but pt should be provoked further       Patient will continue to benefit from skilled outpatient physical therapy to address the deficits listed in the problem list box on initial evaluation, provide pt/family education and to maximize pt's level of independence in the home and community environment.     Patient's spiritual, cultural, and educational needs considered and patient agreeable to plan of care and goals.           Plan:      Goals:   Active       Long       1.Report decreased knee pain < / = 2/10  to increase tolerance for returning to jogging  (Adequate for Care Transition)       Start:  02/21/25    Expected End:  04/18/25            2.Patient goal: eturn to jogging and crossfit with no knee pain   (Adequate for Care Transition)       Start:  02/21/25    Expected End:  04/18/25            3.Increase strength to >/= 4+/5 in hip abduction to increase tolerance for ADL and work activities.  (Adequate for Care Transition)       Start:  02/21/25    Expected End:  04/18/25            4. Pt will report at CJ level (20-40% impaired) on FOTO knee to demonstrate increase in LE function with every day tasks.  (Adequate for Care Transition)       Start:  02/21/25    Expected End:  04/18/25               Short       1.Report decreased knee pain  < / =  4/10  to increase tolerance for stairs at work  (Adequate for Care Transition)       Start:  02/21/25    Expected End:  03/21/25            2. Increase knee ROM to opposite knee in order to be able to perform ADLs without difficulty.  (Adequate for Care Transition)       Start:  02/21/25    Expected End:  03/21/25            3. Increase strength  by 1/3 MMT grade in knee ext  to increase tolerance for ADL and work activities. (Adequate for Care Transition)       Start:  02/21/25    Expected End:  03/21/25             4. Pt to tolerate HEP to improve ROM and independence with ADL's  (Adequate for Care Transition)       Start:  02/21/25    Expected End:  03/21/25                Angel Bhatti, PT

## 2025-03-05 ENCOUNTER — CLINICAL SUPPORT (OUTPATIENT)
Dept: REHABILITATION | Facility: HOSPITAL | Age: 35
End: 2025-03-05
Attending: STUDENT IN AN ORGANIZED HEALTH CARE EDUCATION/TRAINING PROGRAM
Payer: COMMERCIAL

## 2025-03-05 DIAGNOSIS — R29.3 POSTURAL IMBALANCE: Primary | ICD-10-CM

## 2025-03-05 DIAGNOSIS — Z74.09 DECREASED FUNCTIONAL MOBILITY AND ENDURANCE: ICD-10-CM

## 2025-03-05 PROCEDURE — 97110 THERAPEUTIC EXERCISES: CPT

## 2025-03-05 PROCEDURE — 97140 MANUAL THERAPY 1/> REGIONS: CPT

## 2025-03-05 PROCEDURE — 97112 NEUROMUSCULAR REEDUCATION: CPT

## 2025-03-06 NOTE — PROGRESS NOTES
Outpatient Rehab    Physical Therapy Visit    Patient Name: Sania Sage  MRN: [id number]  YOB: 1990  Encounter Date: 3/5/2025    Therapy Diagnosis:   Encounter Diagnoses   Name Primary?    Postural imbalance Yes    Decreased functional mobility and endurance          Physician: Jonny Rowley MD      Physician Orders: Eval and Treat  Medical Diagnosis: M25.461 (ICD-10-CM) - Effusion of right knee joint M67.51 (ICD-10-CM) - Plica of knee, right    Visit # / Visits Authorized:  5/ 20   Date of Evaluation:  2/18/2025   Insurance Authorization Period: 02/06/2025  to 02/06/2026  Plan of Care Certification:  2/18/2025 to 4/22/2025      Time In: 1800   Time Out: 1900  Total Time: 60   Total Billable Time: 60    FOTO:  Intake Score:  %  Survey Score 1:  %  Survey Score 2:  %         Subjective   Continuing to feel improvement.  Pain reported as 2/10.      Objective            Treatment:  Therapeutic Exercise  Therapeutic Exercise Activity 1: Bike 7 min level 2  Therapeutic Exercise Activity 2: Side lying clams- 3x12  Therapeutic Exercise Activity 3: TKE- 3x12 on R LE Black TB onto 4 inch step  Therapeutic Exercise Activity 5: DF mobilizations- 30 reps  Therapeutic Exercise Activity 6: Modified squat range- 3x12 reps from modified range with GTB around knees to cue abduction    Manual Therapy  Manual Therapy Activity 1: Fat pad mobilizations- 2 min  Manual Therapy Activity 2: PA glides of femur- grade 3 and 4  Manual Therapy Activity 3: Tibial IR MWM of R knee    Balance/Neuromuscular Re-Education  Balance/Neuromuscular Re-Education Activity 1: SL side lying shuttle press- 3x12- 1.5 bands  Balance/Neuromuscular Re-Education Activity 2: 4 inch box 3x12  Balance/Neuromuscular Re-Education Activity 3: Banded side steps with RTB around forfoot- 3x5 yard laps  Balance/Neuromuscular Re-Education Activity 4: Single leg step downs 3x10 6 inch step         Assessment & Plan   Assessment: Pt improving  with knee control during single leg step downs. She progressed to 6 inch box today and reports no pain outside of muscle fatigue after step downs. She had some valgus collapse during step downs without extra cueing however she performed the 4 inch box exceptionally well compared to initial attempt during excercise. We updated her hep to TKE with Black TB, step downs from 45 lb plate, clams, and bridges.  Evaluation/Treatment Tolerance: Patient tolerated treatment well    Patient will continue to benefit from skilled outpatient physical therapy to address the deficits listed in the problem list box on initial evaluation, provide pt/family education and to maximize pt's level of independence in the home and community environment.     Patient's spiritual, cultural, and educational needs considered and patient agreeable to plan of care and goals.           Plan: Continue POC.    Goals:   Active       Long       1.Report decreased knee pain < / = 2/10  to increase tolerance for returning to jogging  (Adequate for Care Transition)       Start:  02/21/25    Expected End:  04/18/25            2.Patient goal: eturn to jogging and crossfit with no knee pain   (Adequate for Care Transition)       Start:  02/21/25    Expected End:  04/18/25            3.Increase strength to >/= 4+/5 in hip abduction to increase tolerance for ADL and work activities.  (Adequate for Care Transition)       Start:  02/21/25    Expected End:  04/18/25            4. Pt will report at CJ level (20-40% impaired) on FOTO knee to demonstrate increase in LE function with every day tasks.  (Adequate for Care Transition)       Start:  02/21/25    Expected End:  04/18/25               Short       1.Report decreased knee pain  < / =  4/10  to increase tolerance for stairs at work  (Adequate for Care Transition)       Start:  02/21/25    Expected End:  03/21/25            2. Increase knee ROM to opposite knee in order to be able to perform ADLs without  difficulty.  (Adequate for Care Transition)       Start:  02/21/25    Expected End:  03/21/25            3. Increase strength by 1/3 MMT grade in knee ext  to increase tolerance for ADL and work activities. (Adequate for Care Transition)       Start:  02/21/25    Expected End:  03/21/25             4. Pt to tolerate HEP to improve ROM and independence with ADL's  (Adequate for Care Transition)       Start:  02/21/25    Expected End:  03/21/25                Angel Bhatti, PT

## 2025-03-12 ENCOUNTER — CLINICAL SUPPORT (OUTPATIENT)
Dept: REHABILITATION | Facility: HOSPITAL | Age: 35
End: 2025-03-12
Payer: COMMERCIAL

## 2025-03-12 DIAGNOSIS — R29.3 POSTURAL IMBALANCE: Primary | ICD-10-CM

## 2025-03-12 DIAGNOSIS — Z74.09 DECREASED FUNCTIONAL MOBILITY AND ENDURANCE: ICD-10-CM

## 2025-03-12 PROCEDURE — 97110 THERAPEUTIC EXERCISES: CPT

## 2025-03-12 PROCEDURE — 97112 NEUROMUSCULAR REEDUCATION: CPT

## 2025-03-12 PROCEDURE — 97140 MANUAL THERAPY 1/> REGIONS: CPT

## 2025-03-12 PROCEDURE — 97530 THERAPEUTIC ACTIVITIES: CPT

## 2025-03-12 NOTE — PROGRESS NOTES
Outpatient Rehab    Physical Therapy Visit    Patient Name: Sania Sage  MRN: [id number]  YOB: 1990  Encounter Date: 3/12/2025    Therapy Diagnosis:   Encounter Diagnoses   Name Primary?    Postural imbalance Yes    Decreased functional mobility and endurance      Physician: Jonny Rowley MD    Physician Orders: Eval and Treat  Medical Diagnosis: M25.461 (ICD-10-CM) - Effusion of right knee joint M67.51 (ICD-10-CM) - Plica of knee, right  Visit # / Visits Authorized:  6/ 20   Date of Evaluation:  2/18/2025   Insurance Authorization Period: 02/06/2025  to 02/06/2026  Plan of Care Certification:  2/18/2025 to 4/22/2025      Time In: 1705   Time Out: 1759  Total Time: 54   Total Billable Time: 54    FOTO:  Intake Score: 66%  Survey Score 1:  %  Survey Score 2:  %         Subjective   felt good after last session with decreased pain; able to wlak down three flights of stairs with no pain.  Pain reported as 2/10. Kne extension    Objective    Will update at progress report unless specified         Treatment:  Therapeutic Exercise  Therapeutic Exercise Activity 1: Bike 7 min level 2  Therapeutic Exercise Activity 3: TKE- 3x12 on R LE Black TB onto 4 inch step    Manual Therapy  Manual Therapy Activity 1: Fat pad mobilizations- 2 min  Manual Therapy Activity 2: PA glides of femur- grade 3 and 4  Manual Therapy Activity 4: Lateral glides of tib fem joint    Balance/Neuromuscular Re-Education  Balance/Neuromuscular Re-Education Activity 3: Banded side steps with RTB around forfoot- 3x5 yard laps  Balance/Neuromuscular Re-Education Activity 4: Single leg step downs 3x10 6 inch step  Balance/Neuromuscular Re-Education Activity 7: SLR 30x    Therapeutic Activity  Therapeutic Activity 1: Sneaky lunges 4x8 running position  Therapeutic Activity 2: hip hinge progression seated/sit to stand 10 times; standing RDL 2x15  Therapeutic Activity 3: lateral walks RTB around forefoot 3 laps 5  [facility]  Therapeutic Activity 4: Box squats 3x12 10#    Assessment & Plan   Assessment: Improved knee control today with squats and able to progress hip hinge to a RDL today. Pt still displys muscle fatigue by the end of the session, but had decreased valgus collapse with functional movements today with minimal cueing. Will continue to progress as tolerated. Reduced to one time a week due to schedule conflicts and reduced pain/increased function.  Evaluation/Treatment Tolerance: Patient tolerated treatment well    Patient will continue to benefit from skilled outpatient physical therapy to address the deficits listed in the problem list box on initial evaluation, provide pt/family education and to maximize pt's level of independence in the home and community environment.     Patient's spiritual, cultural, and educational needs considered and patient agreeable to plan of care and goals.     Education  Education was done with Patient. The patient's learning style includes Demonstration and Listening. The patient Demonstrates understanding and Verbalizes understanding.         Proper exercise form, continuing HEP       Plan: Continue POC.    Goals:   Active       Long       1.Report decreased knee pain < / = 2/10  to increase tolerance for returning to jogging  (Progressing)       Start:  02/21/25    Expected End:  04/18/25            2.Patient goal: eturn to jogging and crossfit with no knee pain   (Progressing)       Start:  02/21/25    Expected End:  04/18/25            3.Increase strength to >/= 4+/5 in hip abduction to increase tolerance for ADL and work activities.  (Progressing)       Start:  02/21/25    Expected End:  04/18/25            4. Pt will report at CJ level (20-40% impaired) on FOTO knee to demonstrate increase in LE function with every day tasks.  (Progressing)       Start:  02/21/25    Expected End:  04/18/25               Short       1.Report decreased knee pain  < / =  4/10  to increase tolerance  for stairs at work  (Met)       Start:  02/21/25    Expected End:  03/21/25    Resolved:  03/12/25         2. Increase knee ROM to opposite knee in order to be able to perform ADLs without difficulty.  (Progressing)       Start:  02/21/25    Expected End:  03/21/25            3. Increase strength by 1/3 MMT grade in knee ext  to increase tolerance for ADL and work activities. (Progressing)       Start:  02/21/25    Expected End:  03/21/25             4. Pt to tolerate HEP to improve ROM and independence with ADL's  (Progressing)       Start:  02/21/25    Expected End:  03/21/25                Filiberto Connors, PT, DPT

## 2025-03-19 ENCOUNTER — CLINICAL SUPPORT (OUTPATIENT)
Dept: REHABILITATION | Facility: HOSPITAL | Age: 35
End: 2025-03-19
Payer: COMMERCIAL

## 2025-03-19 DIAGNOSIS — R29.3 POSTURAL IMBALANCE: Primary | ICD-10-CM

## 2025-03-19 DIAGNOSIS — Z74.09 DECREASED FUNCTIONAL MOBILITY AND ENDURANCE: ICD-10-CM

## 2025-03-19 PROCEDURE — 97110 THERAPEUTIC EXERCISES: CPT

## 2025-03-19 PROCEDURE — 97140 MANUAL THERAPY 1/> REGIONS: CPT

## 2025-03-19 PROCEDURE — 97112 NEUROMUSCULAR REEDUCATION: CPT

## 2025-03-19 PROCEDURE — 97530 THERAPEUTIC ACTIVITIES: CPT

## 2025-03-19 NOTE — PROGRESS NOTES
Outpatient Rehab    Physical Therapy Visit    Patient Name: Sania Sage  MRN: [id number]  YOB: 1990  Encounter Date: 3/19/2025    Therapy Diagnosis:   Encounter Diagnoses   Name Primary?    Postural imbalance Yes    Decreased functional mobility and endurance      Physician: Jonny Rowley MD    Physician Orders: Eval and Treat  Medical Diagnosis:     Visit # / Visits Authorized:  7 / 10  Insurance Authorization Period: 2/18/2025 to 12/31/2025     PT/PTA: PT   Number of PTA visits since last PT visit:0  Time In: 1700   Time Out: 1758  Total Time: 58   Total Billable Time: 58    FOTO:  Intake Score: 66%  Survey Score 1:  %  Survey Score 2:  %         Subjective   Feeling improvement and is able to go back to her workout classes. Wants to jump and run soon..  Pain reported as 1/10. Knee extension    Objective    Will update at progress report unless specified         Treatment:  Therapeutic Exercise  TE 1: Bike 7 min level 2  TE 3: TKE- 3x12 on R LE Black TB onto 4 inch step  Manual Therapy  MT 1: Fat pad mobilizations- 2 min  MT 2: PA glides of femur- grade 3 and 4  MT 3: Tibial IR MWM of R knee  MT 4: Lateral glides of tib fem joint  Balance/Neuromuscular Re-Education  NMR 1: SL side lying shuttle press- 3x12- 1.5 bands  NMR 3: Banded side steps with RTB around forfoot- 3x5 yard laps  NMR 4: Single leg step downs 3x10 6 inch step  NMR 7: SLR 30x  Therapeutic Activity  TA 1: Sneaky lunges 4x8 running position  TA 2: hip hinge progression seated/sit to stand 10 times; standing RDL 2x15  TA 3: lateral walks RTB around forefoot 3 laps 5 yards  TA 4: Box squats 3x12 15#    Time Entry(in minutes):  Manual Therapy Time Entry: 8  Neuromuscular Re-Education Time Entry: 16  Therapeutic Activity Time Entry: 24  Therapeutic Exercise Time Entry: 10    Assessment & Plan   Assessment: Continues to struggle with motor control, but she is improving each session with motor cotnrol with min cueing.  Displays muscle fatigue still with exercises especially eccentric exercises. Pt wants to return to running and jumping soon; will tindeq next session and assess readiness for return to jogging. Continue to progress as tolerated.  Evaluation/Treatment Tolerance: Patient tolerated treatment well    Patient will continue to benefit from skilled outpatient physical therapy to address the deficits listed in the problem list box on initial evaluation, provide pt/family education and to maximize pt's level of independence in the home and community environment.     Patient's spiritual, cultural, and educational needs considered and patient agreeable to plan of care and goals.     Education  Education was done with Patient. The patient's learning style includes Demonstration and Listening. The patient Demonstrates understanding and Verbalizes understanding.         Proper exercise form, continuing HEP       Plan: Continue POC.    Goals:   Active       Long       1.Report decreased knee pain < / = 2/10  to increase tolerance for returning to jogging  (Progressing)       Start:  02/21/25    Expected End:  04/18/25            2.Patient goal: eturn to jogging and crossfit with no knee pain   (Progressing)       Start:  02/21/25    Expected End:  04/18/25            3.Increase strength to >/= 4+/5 in hip abduction to increase tolerance for ADL and work activities.  (Progressing)       Start:  02/21/25    Expected End:  04/18/25            4. Pt will report at CJ level (20-40% impaired) on FOTO knee to demonstrate increase in LE function with every day tasks.  (Progressing)       Start:  02/21/25    Expected End:  04/18/25               Short       1.Report decreased knee pain  < / =  4/10  to increase tolerance for stairs at work  (Met)       Start:  02/21/25    Expected End:  03/21/25    Resolved:  03/12/25         2. Increase knee ROM to opposite knee in order to be able to perform ADLs without difficulty.  (Progressing)        Start:  02/21/25    Expected End:  03/21/25            3. Increase strength by 1/3 MMT grade in knee ext  to increase tolerance for ADL and work activities. (Progressing)       Start:  02/21/25    Expected End:  03/21/25             4. Pt to tolerate HEP to improve ROM and independence with ADL's  (Progressing)       Start:  02/21/25    Expected End:  03/21/25                Filiberto Connors, PT, DPT

## 2025-03-25 ENCOUNTER — CLINICAL SUPPORT (OUTPATIENT)
Dept: REHABILITATION | Facility: HOSPITAL | Age: 35
End: 2025-03-25
Payer: COMMERCIAL

## 2025-03-25 DIAGNOSIS — Z74.09 DECREASED FUNCTIONAL MOBILITY AND ENDURANCE: ICD-10-CM

## 2025-03-25 DIAGNOSIS — R29.3 POSTURAL IMBALANCE: Primary | ICD-10-CM

## 2025-03-25 PROCEDURE — 97530 THERAPEUTIC ACTIVITIES: CPT

## 2025-03-25 PROCEDURE — 97110 THERAPEUTIC EXERCISES: CPT

## 2025-03-25 NOTE — PROGRESS NOTES
Outpatient Rehab    Physical Therapy Progress Note    Patient Name: Sania Sage  MRN: [id number]  YOB: 1990  Encounter Date: 3/25/2025    Therapy Diagnosis:   Encounter Diagnoses   Name Primary?    Postural imbalance Yes    Decreased functional mobility and endurance      Physician: Jonny Rowley MD    Physician Orders: Eval and Treat  Medical Diagnosis:     Visit # / Visits Authorized:  8 / 10  Insurance Authorization Period: 2/18/2025 to 12/31/2025  Date of Evaluation:  2/18/2025   Plan of Care Certification:  2/18/2025 to 4/22/2025     PT/PTA: PT   Number of PTA visits since last PT visit:0  Time In: 1655   Time Out: 1757  Total Time: 62   Total Billable Time: 62    FOTO:  Intake Score: 66%  Survey Score 1:  %  Survey Score 2:  %    Precautions     Standard      Subjective   Cotninues to improve; no pain with going down stairs has to focus to prevent valgus. Pt wants to return to hopping and jogging.  Pain reported as 0/10. Knee extension    Objective    3/25/2025    Knee Extension iso at 60° (kg)                   Right Left                 1 31.1 30.4                 2 27.3 34.6                 3 31.5 32.7                                       Avg 30.0 32.6                    LSI R Involved     92.0% % DIFF R Involved                                       Knee Flexion iso at 60° (kg)                   Right Left                 1 26.9 25.1                 2 27.0 27.5                 3 29.3 24.0                                       Avg                      LSI R Involved   % % DIFF R Involved                                       Hip Abd Tindeq:   Avg:    R: 14.15 kgs; L: 15.45 kgs    LSI R Involved: 91.6%            Treatment:  Therapeutic Exercise  TE 1: Bike 10 min level 2  Manual Therapy  MT 2: PA glides of femur- grade 3 and 4  MT 3: Tibial IR MWM of R knee  MT 4: Lateral glides of tib fem joint  Therapeutic Activity  TA 1: tindeq quad, hamstring, and hip abd  TA 2: SL calf  "raises  TA 3: SL squats 30x  TA 4: SLS 1x 30" each leg  TA 5: DL hopping in place 3x30  TA 6: DL hoping fwd/back 3x30  TA 7: DL hopping lateral 3x30  TA 8: SL hopping in place 3x20  TA 9: SL hopping fwd/back 3x20  TA 10: SL hopping lateral 3x20    Time Entry(in minutes):  Manual Therapy Time Entry: 5  Therapeutic Activity Time Entry: 47  Therapeutic Exercise Time Entry: 10    Assessment & Plan   Assessment: Pt reassesed today to measure progress since beginning therapy and readiness for return to running/hopping. Pt passed all return to run test including LSI (over 90%), single leg stance, single leg squat, and single leg calf raises. Pt was taken through first phase of running which was plyometrics. Able to complete without increasing pain and was educated with a print out on the next stages and soreness rules. Pt has made great progress since beginning therapy and can still benefit from therapy with motor control exercises to prevent dynamic knee valgus to allow a pain free return to prior level of activity.  Evaluation/Treatment Tolerance: Patient tolerated treatment well    Patient will continue to benefit from skilled outpatient physical therapy to address the deficits listed in the problem list box on initial evaluation, provide pt/family education and to maximize pt's level of independence in the home and community environment.     Patient's spiritual, cultural, and educational needs considered and patient agreeable to plan of care and goals.           Plan: Continue POC.    Goals:   Active       Long       1.Report decreased knee pain < / = 2/10  to increase tolerance for returning to jogging  (Met)       Start:  02/21/25    Expected End:  04/18/25    Resolved:  03/25/25         2.Patient goal: eturn to jogging and crossfit with no knee pain   (Progressing)       Start:  02/21/25    Expected End:  04/18/25            3.Increase strength to >/= 4+/5 in hip abduction to increase tolerance for ADL and work " activities.  (Progressing)       Start:  02/21/25    Expected End:  04/18/25            4. Pt will report at CJ level (20-40% impaired) on FOTO knee to demonstrate increase in LE function with every day tasks.  (Progressing)       Start:  02/21/25    Expected End:  04/18/25              Resolved       Short       1.Report decreased knee pain  < / =  4/10  to increase tolerance for stairs at work  (Met)       Start:  02/21/25    Expected End:  03/21/25    Resolved:  03/12/25         2. Increase knee ROM to opposite knee in order to be able to perform ADLs without difficulty.  (Met)       Start:  02/21/25    Expected End:  03/21/25    Resolved:  03/25/25         3. Increase strength by 1/3 MMT grade in knee ext  to increase tolerance for ADL and work activities. (Met)       Start:  02/21/25    Expected End:  03/21/25    Resolved:  03/25/25          4. Pt to tolerate HEP to improve ROM and independence with ADL's  (Met)       Start:  02/21/25    Expected End:  03/21/25    Resolved:  03/25/25             Filiberto Connors, PT, DPT

## 2025-04-02 ENCOUNTER — CLINICAL SUPPORT (OUTPATIENT)
Dept: REHABILITATION | Facility: HOSPITAL | Age: 35
End: 2025-04-02
Payer: COMMERCIAL

## 2025-04-02 DIAGNOSIS — R29.3 POSTURAL IMBALANCE: Primary | ICD-10-CM

## 2025-04-02 DIAGNOSIS — Z74.09 DECREASED FUNCTIONAL MOBILITY AND ENDURANCE: ICD-10-CM

## 2025-04-02 PROCEDURE — 97530 THERAPEUTIC ACTIVITIES: CPT

## 2025-04-02 PROCEDURE — 97110 THERAPEUTIC EXERCISES: CPT

## 2025-04-02 PROCEDURE — 97140 MANUAL THERAPY 1/> REGIONS: CPT

## 2025-04-03 NOTE — PROGRESS NOTES
Outpatient Rehab    Physical Therapy Progress Note    Patient Name: Sania Sage  MRN: [id number]  YOB: 1990  Encounter Date: 4/2/2025    Therapy Diagnosis:   Encounter Diagnoses   Name Primary?    Postural imbalance Yes    Decreased functional mobility and endurance        Physician: Jonny Rowley MD    Physician Orders: Eval and Treat  Medical Diagnosis:     Visit # / Visits Authorized:  9 / 20  Insurance Authorization Period: 2/18/2025 to 12/31/2025  Date of Evaluation:  2/18/2025   Plan of Care Certification:  2/18/2025 to 4/22/2025     PT/PTA: PT   Number of PTA visits since last PT visit:0  Time In: 1702   Time Out: 1758  Total Time: 56   Total Billable Time: 56    FOTO:  Intake Score: 66%  Survey Score 1:  %  Survey Score 2:  %         Subjective   Tried to do running progressions and she got sore after the first progression, so she was unsure of what to do..  Pain reported as 0/10.      Objective    3/25/2025    Knee Extension iso at 60° (kg)                   Right Left                 1 31.1 30.4                 2 27.3 34.6                 3 31.5 32.7                                       Avg 30.0 32.6                    LSI R Involved     92.0% % DIFF R Involved                                       Knee Flexion iso at 60° (kg)                   Right Left                 1 26.9 25.1                 2 27.0 27.5                 3 29.3 24.0                                       Avg                      LSI R Involved   % % DIFF R Involved                                       Hip Abd Tindeq:   Avg:    R: 14.15 kgs; L: 15.45 kgs    LSI R Involved: 91.6%            Treatment:  Therapeutic Exercise  TE 1: Bike 10 min level 2  TE 2: donkey kicks with shuttle 1 band 3x12 each leg  TE 3: lateral stepping 5 laps 5yds GTB on feet  Manual Therapy  MT 1: Fat pad mobilizations- 2 min  MT 2: PA glides of femur- grade 3 and 4  MT 3: Tibial IR MWM of R knee  MT 4: Lateral glides of tib fem  joint  Therapeutic Activity  TA 1: sneaky lunges 4x8 each leg  TA 2: Single leg RDLs 4x8 each leg  TA 3: SL squats 3x12    Time Entry(in minutes):  Manual Therapy Time Entry: 12  Therapeutic Activity Time Entry: 20  Therapeutic Exercise Time Entry: 24    Assessment & Plan   Assessment: Pt was taken through strength exercises today for the hip, knee and ankle joints to help with her return to run progress. Pt was given fucntional exercises that were all components of running. Pt educated to take at least 24 hours off before returning to the run program. Pt reeducated on soreness rules and educated to use the handout as a guide. Pt given rest breaks between exercise since exercises were more challenging today. Will continue to progress pt as tolerated.  Evaluation/Treatment Tolerance: Patient tolerated treatment well    Patient will continue to benefit from skilled outpatient physical therapy to address the deficits listed in the problem list box on initial evaluation, provide pt/family education and to maximize pt's level of independence in the home and community environment.     Patient's spiritual, cultural, and educational needs considered and patient agreeable to plan of care and goals.     Education  Education was done with Patient. The patient's learning style includes Demonstration and Listening. The patient Demonstrates understanding and Verbalizes understanding.         Proper exercise form, continuing HEP       Plan: Continue POC.    Goals:   Active       Long       1.Report decreased knee pain < / = 2/10  to increase tolerance for returning to jogging  (Met)       Start:  02/21/25    Expected End:  04/18/25    Resolved:  03/25/25         2.Patient goal: eturn to jogging and crossfit with no knee pain   (Progressing)       Start:  02/21/25    Expected End:  04/18/25            3.Increase strength to >/= 4+/5 in hip abduction to increase tolerance for ADL and work activities.  (Progressing)       Start:   02/21/25    Expected End:  04/18/25            4. Pt will report at CJ level (20-40% impaired) on FOTO knee to demonstrate increase in LE function with every day tasks.  (Progressing)       Start:  02/21/25    Expected End:  04/18/25              Resolved       Short       1.Report decreased knee pain  < / =  4/10  to increase tolerance for stairs at work  (Met)       Start:  02/21/25    Expected End:  03/21/25    Resolved:  03/12/25         2. Increase knee ROM to opposite knee in order to be able to perform ADLs without difficulty.  (Met)       Start:  02/21/25    Expected End:  03/21/25    Resolved:  03/25/25         3. Increase strength by 1/3 MMT grade in knee ext  to increase tolerance for ADL and work activities. (Met)       Start:  02/21/25    Expected End:  03/21/25    Resolved:  03/25/25          4. Pt to tolerate HEP to improve ROM and independence with ADL's  (Met)       Start:  02/21/25    Expected End:  03/21/25    Resolved:  03/25/25             Filiberto Connors, PT, DPT

## 2025-04-08 ENCOUNTER — OFFICE VISIT (OUTPATIENT)
Dept: SPORTS MEDICINE | Facility: CLINIC | Age: 35
End: 2025-04-08
Payer: COMMERCIAL

## 2025-04-08 ENCOUNTER — CLINICAL SUPPORT (OUTPATIENT)
Dept: REHABILITATION | Facility: HOSPITAL | Age: 35
End: 2025-04-08
Payer: COMMERCIAL

## 2025-04-08 VITALS
DIASTOLIC BLOOD PRESSURE: 86 MMHG | BODY MASS INDEX: 31.52 KG/M2 | WEIGHT: 208 LBS | HEART RATE: 71 BPM | HEIGHT: 68 IN | SYSTOLIC BLOOD PRESSURE: 129 MMHG

## 2025-04-08 DIAGNOSIS — R29.3 POSTURAL IMBALANCE: Primary | ICD-10-CM

## 2025-04-08 DIAGNOSIS — S83.281A ACUTE LATERAL MENISCUS TEAR OF RIGHT KNEE, INITIAL ENCOUNTER: Primary | ICD-10-CM

## 2025-04-08 DIAGNOSIS — Z74.09 DECREASED FUNCTIONAL MOBILITY AND ENDURANCE: ICD-10-CM

## 2025-04-08 PROCEDURE — 99999 PR PBB SHADOW E&M-EST. PATIENT-LVL III: CPT | Mod: PBBFAC,,, | Performed by: STUDENT IN AN ORGANIZED HEALTH CARE EDUCATION/TRAINING PROGRAM

## 2025-04-08 PROCEDURE — 1159F MED LIST DOCD IN RCRD: CPT | Mod: CPTII,S$GLB,, | Performed by: STUDENT IN AN ORGANIZED HEALTH CARE EDUCATION/TRAINING PROGRAM

## 2025-04-08 PROCEDURE — 97530 THERAPEUTIC ACTIVITIES: CPT

## 2025-04-08 PROCEDURE — 3008F BODY MASS INDEX DOCD: CPT | Mod: CPTII,S$GLB,, | Performed by: STUDENT IN AN ORGANIZED HEALTH CARE EDUCATION/TRAINING PROGRAM

## 2025-04-08 PROCEDURE — 97140 MANUAL THERAPY 1/> REGIONS: CPT

## 2025-04-08 PROCEDURE — 3079F DIAST BP 80-89 MM HG: CPT | Mod: CPTII,S$GLB,, | Performed by: STUDENT IN AN ORGANIZED HEALTH CARE EDUCATION/TRAINING PROGRAM

## 2025-04-08 PROCEDURE — 97110 THERAPEUTIC EXERCISES: CPT

## 2025-04-08 PROCEDURE — 1160F RVW MEDS BY RX/DR IN RCRD: CPT | Mod: CPTII,S$GLB,, | Performed by: STUDENT IN AN ORGANIZED HEALTH CARE EDUCATION/TRAINING PROGRAM

## 2025-04-08 PROCEDURE — 99213 OFFICE O/P EST LOW 20 MIN: CPT | Mod: S$GLB,,, | Performed by: STUDENT IN AN ORGANIZED HEALTH CARE EDUCATION/TRAINING PROGRAM

## 2025-04-08 PROCEDURE — 3074F SYST BP LT 130 MM HG: CPT | Mod: CPTII,S$GLB,, | Performed by: STUDENT IN AN ORGANIZED HEALTH CARE EDUCATION/TRAINING PROGRAM

## 2025-04-08 NOTE — PROGRESS NOTES
Subjective:          Chief Complaint: Sania Sage is a 35 y.o. female who had concerns including Pain of the Right Knee.    HPI 04/08/2025  History of Present Illness    CHIEF COMPLAINT:  - Knee pain    HPI:  Client presents for follow-up of knee pain. She reports significant improvement in knee condition. She experiences intermittent pain in the middle and side of the knee, particularly when running slowly and towards the end of activity when fatigued. Pain is described as a tingling sensation that resolves spontaneously. She denies the severe pain experienced previously. She reports occasional cracking sensations in the knee but denies swelling, catching, locking, or instability. She is currently able to run slowly and is making progress. An MRI showed minor signal changes on the meniscus, correlating with the area of pain.    WORK STATUS:  - She is waiting for a contract.  - She plans to move to [place] for work in September.  - She will have two months off between current position and new job.           HPI 2/6/2025  CHIEF COMPLAINT:  - Right knee pain follow-up    HPI:  Client presents for follow-up of right knee pain. She reports improvement, stating the knee is not as curved. She confirms she can fully straighten her knee. Right knee pain started approximately two weeks ago. She describes occasional unusual sensations in the knee. She has been using Voltaren gel for treatment and continued going to the gym, doing modified squats, noting improvement in her condition. She inquires about appropriate activity levels, specifically regarding running.    She denies catching, clicking, or snapping sensations in the knee. She also denies taking oral anti-inflammatory medications such as ibuprofen or naproxen.    PREVIOUS TREATMENTS:  - Voltaren gel: Client has been using this topically  - Modified squats: Client has been performing these at the gym, noting improvement in condition           HPI  1/30/2025  History of Present Illness    CHIEF COMPLAINT:  - Right knee injury follow-up.    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 She initially experienced pain on the side of the knee with bending. After stopping running for 1 week and decreasing weight load, symptoms initially improved but returned in early December after performing front squats and wall pulls. She describes the sensation as abnormal and reports that her knee does not fully extend. There was initial swelling, but she is unsure if it persists. She has been wearing a brace, which is no longer effective. Client participated in a half marathon on the [place] prior to the injury. She denies any previous knee injuries or surgeries.    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 No pertinent past medical history.    Current Outpatient Medications on File Prior to Visit   Medication Sig Dispense Refill    meloxicam (MOBIC) 15 MG tablet Take 1 tablet (15 mg total) by mouth once daily. 30 tablet 2    moxifloxacin (VIGAMOX) 0.5 % ophthalmic solution PLACE 1 DROP INTO AFFECTED EYE(S) TWICE A DAY X 7 DAYS (Patient not taking: Reported on 4/8/2025) 3 mL 1     No current facility-administered medications on file prior to visit.       History reviewed. No pertinent surgical history.    No family history on file.    Social History     Socioeconomic History    Marital status: Single      Review of Systems   Constitutional: Negative.   HENT: Negative.     Eyes: Negative.    Cardiovascular: Negative.    Respiratory: Negative.      Endocrine: Negative.    Hematologic/Lymphatic: Negative.    Skin: Negative.    Musculoskeletal:  Positive for joint pain. Negative for joint swelling and stiffness.   Neurological: Negative.    Psychiatric/Behavioral: Negative.     Allergic/Immunologic: Negative.        Pain Related Questions  Over the past 3 days, what was your average pain during activity? (I.e. running, jogging, walking, climbing stairs, getting dressed, ect.): 2  Over the past 3 days, what was your highest pain level?: 3  Over the past 3 days, what was your lowest pain level? : 0    Other  How many nights a week are you awakened by your affected body part?: 0  Was the patient's HEIGHT measured or patient reported?: Patient Reported  Was the patient's WEIGHT measured or patient reported?: Measured      Objective:        General: Sania is well-developed, well-nourished, appears stated age, in no acute distress, alert and oriented to time, place and person.     General    Nursing note and vitals reviewed.  Constitutional: She is oriented to person, place, and time. She appears well-developed and well-nourished. No distress.   HENT:   Head: Normocephalic and atraumatic.   Nose: Nose normal.   Eyes: EOM are normal.   Cardiovascular:  Intact distal pulses.            Pulmonary/Chest: Effort normal. No respiratory distress.   Neurological: She is alert and oriented to person, place, and time.   Psychiatric: She has a normal mood and affect. Her behavior is normal. Judgment and thought content normal.     General Musculoskeletal Exam   Gait: normal       Right Knee Exam     Inspection   Erythema: absent  Scars: absent  Effusion: absent  Deformity: absent  Bruising: absent    Tenderness   The patient is tender to palpation of the lateral joint line.    Range of Motion   Extension:  0   Flexion:  130     Tests   Meniscus   Urmila:  Medial - negative Lateral - negative  Ligament Examination   Lachman: normal (-1 to 2mm)   PCL-Posterior Drawer:  normal (0 to 2mm)     MCL - Valgus: normal (0 to 2mm)  LCL - Varus: normal  Patella   Patellar apprehension: negative  Passive Patellar Tilt: neutral  Patellar Tracking: normal  Patellar Grind: negative    Other   Sensation: normal    Left Knee Exam     Inspection   Erythema: absent  Scars: absent  Swelling: absent  Effusion: absent  Deformity: absent  Bruising: absent    Tenderness   The patient is experiencing no tenderness.     Range of Motion   Extension:  -5   Flexion:  140     Tests   Meniscus   Urmila:  Medial - negative Lateral - negative  Stability   Lachman: normal (-1 to 2mm)   PCL-Posterior Drawer: normal (0 to 2mm)  MCL - Valgus: normal (0 to 2mm)  LCL - Varus: normal (0 to 2mm)  Patella   Patellar apprehension: negative  Passive Patellar Tilt: neutral  Patellar Tracking: normal  Patellar Grind: negative    Other   Sensation: normal    Muscle Strength   Right Lower Extremity   Quadriceps:  5/5   Hamstrin/5   Left Lower Extremity   Quadriceps:  5/5   Hamstrin/5     Vascular Exam     Right Pulses  Dorsalis Pedis:      2+  Posterior Tibial:      2+        Left Pulses  Dorsalis Pedis:      2+  Posterior Tibial:      2+        Edema  Right Lower Leg: absent  Left Lower Leg: absent        Imaging:   X-rays of the bilateral knees from 2025 personally viewed by me on that day these include weight-bearing AP, PA flexion lateral, Merchant views.  Joint space is well-maintained.  No fracture.  No bony abnormality.  No arthritic changes.      MRI of the right knee from 2025 personally viewed by me 2025.  The cruciate and collateral ligaments are intact, however there is some straightening of the ACL but overall intact.  Lateral meniscus appears intact.  Medial meniscus has some in his increased signal in the posterior horn but no obvious tear.  There is a joint effusion with synovitis.  Cartilage appears preserved in all 3 compartments      Assessment:   Sania Sage is a  35 y.o. female with right knee pain concerning for medial meniscal injury  1. Acute lateral meniscus tear of right knee, initial encounter                  Plan:         Assessment & Plan    PROCEDURES:  - Discussed potential surgical options for meniscus repair:  - Trimming the meniscus (4-6 weeks recovery),  - Fixing the meniscus (6 weeks on crutches).  - Advised against surgery at this time due to patient's improving condition.    FOLLOW UP:  - Follow up in early to mid-June (about 2 months).    PATIENT INSTRUCTIONS:  - Obtain a copy of MRI on disc from the imaging center on [facility] before leaving [place].         This note was generated with the assistance of ambient listening technology. Verbal consent was obtained by the patient and accompanying visitor(s) for the recording of patient appointment to facilitate this note. I attest to having reviewed and edited the generated note for accuracy, though some syntax or spelling errors may persist. Please contact the author of this note for any clarification.

## 2025-04-08 NOTE — PROGRESS NOTES
Outpatient Rehab    Physical Therapy Progress Note    Patient Name: Sania Sage  MRN: [id number]  YOB: 1990  Encounter Date: 4/8/2025    Therapy Diagnosis:   Encounter Diagnoses   Name Primary?    Postural imbalance Yes    Decreased functional mobility and endurance          Physician: Jonny Rowley MD    Physician Orders: Eval and Treat  Medical Diagnosis:     Visit # / Visits Authorized:  10 / 20  Insurance Authorization Period: 2/18/2025 to 12/31/2025  Date of Evaluation:  2/18/2025   Plan of Care Certification:  2/18/2025 to 4/22/2025     PT/PTA: PT   Number of PTA visits since last PT visit:0  Time In: 0804   Time Out: 0900  Total Time: 56   Total Billable Time: 56    FOTO:  Intake Score: 66%  Survey Score 1: 79%  Survey Score 2:  %    Precautions     Standard      Subjective   Able to progress furhter with running and notices if she starts having pain she can self correct her form to stop pain..  Pain reported as 0/10.      Objective    3/25/2025    Knee Extension iso at 60° (kg)                   Right Left                 1 31.1 30.4                 2 27.3 34.6                 3 31.5 32.7                                       Avg 30.0 32.6                    LSI R Involved     92.0% % DIFF R Involved                                       Knee Flexion iso at 60° (kg)                   Right Left                 1 26.9 25.1                 2 27.0 27.5                 3 29.3 24.0                                       Avg                      LSI R Involved   % % DIFF R Involved                                       Hip Abd Tindeq:   Avg:    R: 14.15 kgs; L: 15.45 kgs    LSI R Involved: 91.6%            Treatment:  Therapeutic Exercise  TE 1: Bike 10 min level 2  TE 2: donkey kicks with shuttle 1 band 3x12 each leg  TE 3: standing fire hydrants 3x8 each leg  Manual Therapy  MT 1: Fat pad mobilizations- 2 min  MT 2: PA glides of femur- grade 3 and 4  MT 3: Tibial IR MWM of R  knee  MT 4: Lateral glides of tib fem joint  Therapeutic Activity  TA 1: sneaky lunges 4x8 each leg  TA 2: Single leg RDLs 4x8 each leg 10# kb  TA 3: SL squats 3x12 each side  TA 4: Khmer split squats 4x8    Time Entry(in minutes):  Manual Therapy Time Entry: 10  Therapeutic Activity Time Entry: 27  Therapeutic Exercise Time Entry: 19    Assessment & Plan   Assessment: Continued to load patient especially with single leg and in running positons to help facilitate return to run. Focused on preventing dynamic knee valgus with single leg loading on R knee to help retrain knee postioning during running. Pt continues to progress with running program at her own pace and is doing well making self corrrections. Will continue to progress as tolerated.  Evaluation/Treatment Tolerance: Patient tolerated treatment well    Patient will continue to benefit from skilled outpatient physical therapy to address the deficits listed in the problem list box on initial evaluation, provide pt/family education and to maximize pt's level of independence in the home and community environment.     Patient's spiritual, cultural, and educational needs considered and patient agreeable to plan of care and goals.     Education  Education was done with Patient. The patient's learning style includes Demonstration and Listening. The patient Demonstrates understanding and Verbalizes understanding.         Proper exercise form, continuing HEP         Plan: Continue POC.    Goals:   Active       Long       1.Report decreased knee pain < / = 2/10  to increase tolerance for returning to jogging  (Met)       Start:  02/21/25    Expected End:  04/18/25    Resolved:  03/25/25         2.Patient goal: eturn to jogging and crossfit with no knee pain   (Progressing)       Start:  02/21/25    Expected End:  04/18/25            3.Increase strength to >/= 4+/5 in hip abduction to increase tolerance for ADL and work activities.  (Progressing)       Start:   02/21/25    Expected End:  04/18/25            4. Pt will report at CJ level (20-40% impaired) on FOTO knee to demonstrate increase in LE function with every day tasks.  (Progressing)       Start:  02/21/25    Expected End:  04/18/25              Resolved       Short       1.Report decreased knee pain  < / =  4/10  to increase tolerance for stairs at work  (Met)       Start:  02/21/25    Expected End:  03/21/25    Resolved:  03/12/25         2. Increase knee ROM to opposite knee in order to be able to perform ADLs without difficulty.  (Met)       Start:  02/21/25    Expected End:  03/21/25    Resolved:  03/25/25         3. Increase strength by 1/3 MMT grade in knee ext  to increase tolerance for ADL and work activities. (Met)       Start:  02/21/25    Expected End:  03/21/25    Resolved:  03/25/25          4. Pt to tolerate HEP to improve ROM and independence with ADL's  (Met)       Start:  02/21/25    Expected End:  03/21/25    Resolved:  03/25/25             Filiberto Connors, PT, DPT

## 2025-04-16 ENCOUNTER — CLINICAL SUPPORT (OUTPATIENT)
Dept: REHABILITATION | Facility: HOSPITAL | Age: 35
End: 2025-04-16
Payer: COMMERCIAL

## 2025-04-16 DIAGNOSIS — Z74.09 DECREASED FUNCTIONAL MOBILITY AND ENDURANCE: ICD-10-CM

## 2025-04-16 DIAGNOSIS — R29.3 POSTURAL IMBALANCE: Primary | ICD-10-CM

## 2025-04-16 PROCEDURE — 97110 THERAPEUTIC EXERCISES: CPT

## 2025-04-16 PROCEDURE — 97530 THERAPEUTIC ACTIVITIES: CPT

## 2025-04-16 NOTE — PROGRESS NOTES
Outpatient Rehab    Physical Therapy Progress Note    Patient Name: Sania Sage  MRN: [id number]  YOB: 1990  Encounter Date: 4/16/2025    Therapy Diagnosis:   Encounter Diagnoses   Name Primary?    Postural imbalance Yes    Decreased functional mobility and endurance        Physician: Jonny Rowley MD    Physician Orders: Eval and Treat  Medical Diagnosis:     Visit # / Visits Authorized:  11 / 20  Insurance Authorization Period: 2/18/2025 to 12/31/2025  Date of Evaluation:  2/18/2025   Plan of Care Certification:  2/18/2025 to 4/22/2025     PT/PTA: PT   Number of PTA visits since last PT visit:0  Time In: 1700   Time Out: 1759  Total Time: 59   Total Billable Time: 59    FOTO:  Intake Score: 66%  Survey Score 1: 79%  Survey Score 2:  %    Precautions     Standard      Subjective   Cotninued progressing return to run, but she is stuck on her current phase due to muscle faitgue but not pain. She is sturggling with endurance.  Pain reported as 0/10.      Objective    3/25/2025    Knee Extension iso at 60° (kg)                   Right Left                 1 31.1 30.4                 2 27.3 34.6                 3 31.5 32.7                                       Avg 30.0 32.6                    LSI R Involved     92.0% % DIFF R Involved                                       Knee Flexion iso at 60° (kg)                   Right Left                 1 26.9 25.1                 2 27.0 27.5                 3 29.3 24.0                                       Avg                      LSI R Involved   % % DIFF R Involved                                       Hip Abd Tindeq:   Avg:    R: 14.15 kgs; L: 15.45 kgs    LSI R Involved: 91.6%            Treatment:  Therapeutic Exercise  TE 1: Bike 10 min level 2  TE 2: donkey kicks with shuttle 1 band 3x15 each leg  TE 3: standing fire hydrants 4x8 each leg  TE 4: Single leg RDLs 3x15 each leg 10# kb  Manual Therapy  MT 1: knee assesment upon  arrival  Therapeutic Activity  TA 1: sneaky lunges 4x8 each leg  TA 2: snapdowns with medicine ball 10# 4x6  TA 3: SL squat holds 4x6 15 second holds  TA 4: shuttle jumps 3x15 3 bands    Time Entry(in minutes):  Manual Therapy Time Entry: 2  Therapeutic Activity Time Entry: 30  Therapeutic Exercise Time Entry: 27    Assessment & Plan   Assessment: Cotninued to load patient into functional positons; focusing on endurance. Pt was able to complete isometric single leg squat holds with good from afte rminimal cueing. Focus of today's exercises were to prevent dynamic knee valgus and femoral IR. Pt was able to control for both of these factors in all exercises today with minimal cueing needed. Introduced plyometrics today with shuttle jumps and snapdowns with a medicine ball; again focusing on preventing knee valgus. Will continue to progress as tolerated.  Evaluation/Treatment Tolerance: Patient tolerated treatment well    Patient will continue to benefit from skilled outpatient physical therapy to address the deficits listed in the problem list box on initial evaluation, provide pt/family education and to maximize pt's level of independence in the home and community environment.     Patient's spiritual, cultural, and educational needs considered and patient agreeable to plan of care and goals.     Education  Education was done with Patient. The patient's learning style includes Demonstration and Listening. The patient Demonstrates understanding and Verbalizes understanding.         Proper exercise form, continuing HEP    Plan: Continue POC.    Goals:   Active       Long       1.Report decreased knee pain < / = 2/10  to increase tolerance for returning to jogging  (Met)       Start:  02/21/25    Expected End:  04/18/25    Resolved:  03/25/25         2.Patient goal: eturn to jogging and crossfit with no knee pain   (Progressing)       Start:  02/21/25    Expected End:  04/18/25            3.Increase strength to >/= 4+/5  in hip abduction to increase tolerance for ADL and work activities.  (Progressing)       Start:  02/21/25    Expected End:  04/18/25            4. Pt will report at CJ level (20-40% impaired) on FOTO knee to demonstrate increase in LE function with every day tasks.  (Progressing)       Start:  02/21/25    Expected End:  04/18/25              Resolved       Short       1.Report decreased knee pain  < / =  4/10  to increase tolerance for stairs at work  (Met)       Start:  02/21/25    Expected End:  03/21/25    Resolved:  03/12/25         2. Increase knee ROM to opposite knee in order to be able to perform ADLs without difficulty.  (Met)       Start:  02/21/25    Expected End:  03/21/25    Resolved:  03/25/25         3. Increase strength by 1/3 MMT grade in knee ext  to increase tolerance for ADL and work activities. (Met)       Start:  02/21/25    Expected End:  03/21/25    Resolved:  03/25/25          4. Pt to tolerate HEP to improve ROM and independence with ADL's  (Met)       Start:  02/21/25    Expected End:  03/21/25    Resolved:  03/25/25             Filiberto Connors, PT, DPT

## 2025-04-23 ENCOUNTER — CLINICAL SUPPORT (OUTPATIENT)
Dept: REHABILITATION | Facility: HOSPITAL | Age: 35
End: 2025-04-23
Payer: COMMERCIAL

## 2025-04-23 DIAGNOSIS — Z74.09 DECREASED FUNCTIONAL MOBILITY AND ENDURANCE: ICD-10-CM

## 2025-04-23 DIAGNOSIS — R29.3 POSTURAL IMBALANCE: Primary | ICD-10-CM

## 2025-04-23 PROCEDURE — 97110 THERAPEUTIC EXERCISES: CPT

## 2025-04-23 PROCEDURE — 97140 MANUAL THERAPY 1/> REGIONS: CPT

## 2025-04-23 PROCEDURE — 97530 THERAPEUTIC ACTIVITIES: CPT

## 2025-04-23 PROCEDURE — 97164 PT RE-EVAL EST PLAN CARE: CPT

## 2025-04-23 NOTE — PROGRESS NOTES
Outpatient Rehab    Physical Therapy Progress Note : Updated Plan of Care    Patient Name: Sania Sage  MRN: [id number]  YOB: 1990  Encounter Date: 4/23/2025    Therapy Diagnosis:   Encounter Diagnoses   Name Primary?    Postural imbalance Yes    Decreased functional mobility and endurance      Physician: Jonny Rowley MD    Physician Orders: Eval and Treat  Medical Diagnosis:     Visit # / Visits Authorized:  12 / 20  Insurance Authorization Period: 2/18/2025 to 12/31/2025  Date of Evaluation:  2/18/2025   Plan of Care Certification:  2/18/2025 to 4/22/2025      PT/PTA: PT   Number of PTA visits since last PT visit:0  Time In: 1650   Time Out: 1750  Total Time: 60   Total Billable Time: 60    FOTO:  Intake Score: 66%  Survey Score 1:  %  Survey Score 2:  %    Precautions     Standard      Subjective   She is continuing to progress return to run program and going back to more actvities at crossfit and the gym.  Pain reported as 0/10.      Objective       Knee Range of Motion   Right Knee   Active (deg) Passive (deg) Pain   Flexion 140 140     Extension 0 5         Left Knee   Active (deg) Passive (deg) Pain   Flexion 140 140     Extension 10 10         (-) indicates lack of extension               Hip Strength - Planes of Motion   Right Strength Right Pain Left Strength Left  Pain   Flexion (L2) 5   5     Extension 4   4     ABduction 4   4     ADduction           Internal Rotation 4   5     External Rotation 4-   4         Knee Strength   Right Strength Right Pain Left Strength Left  Pain   Flexion (S2)           Prone Flexion           Extension (L3)             Recently tested on tindeq           Squat Testing        Good form, no knee valgus, no pain with squatting, able to prevent femoral IR on own     Single Leg Squat Testing - Right Leg              Good form; able to self correct knee valgus and femoral IR    Single Leg Squat Testing - Left Leg              Good form no knee  "valgus or IR            Treatment:  Therapeutic Exercise  TE 1: Bike 10 min level 2  TE 2: donkey kicks with shuttle 1 band 3x15 each leg  TE 3: captain yulianas 4x8  TE 6: SL pretzels 2x12 each  TE 7: rom and strength assessment  Manual Therapy  MT 1: patella and fat pad mobilizations; gr III-IV  MT 2: PA glides of femur- grade 3 and 4  MT 4: Lateral glides of tib fem joint  Therapeutic Activity  TA 1: sneaky lunges 4x8 on 6" box each leg  TA 2: snapdowns with medicine ball 10# 4x6  TA 4: shuttle jumps 3x15 3 bands  TA 5: box jumps on 6" box 4x6    Time Entry(in minutes):  PT Re-Evaluation Time Entry: 10  Manual Therapy Time Entry: 10  Therapeutic Activity Time Entry: 26  Therapeutic Exercise Time Entry: 14    Assessment & Plan   Assessment  Sania presents with a condition of Low complexity.   Presentation of Symptoms: Stable  Will Comorbidities Impact Care: No       Functional Limitations: Activity tolerance, Ambulating on uneven surfaces, Decreased ambulation distance/endurance, Functional mobility, Participating in leisure activities, Participating in sports, Range of motion, Squatting  Impairments: Abnormal gait, Abnormal or restricted range of motion, Impaired physical strength, Impaired balance, Pain with functional activity, Weight-bearing intolerance  Personal Factors Affecting Prognosis: Schedule, Transportation    Patient Goal for Therapy (PT): to return to crossfit and jogging without pain  Prognosis: Excellent  Assessment Details: Pt was reassessed today and displays increased ROM, strength, and functional mobility. She still has weakness in her hip muscles and not quite as much hyperextension upon arrival. She is able to qucikly reach full hyperextension after manual therapy. She still remains a good candidate for therapy as she progresses back to return to activity and tries to reach full PLOF. Continued with plyometrics today with box jumps and she was able to do them with proper form and exercises " focused on proper form with less weightbearing before. Pt will cotinue to progress as tolerated and will return to therapy for 8 more weeks once per week.Patient demonstrates deficits with range of motion, strength, and function that limit ability to participate in school, work, and recreational activities. They would benefit from skilled PT services to normalize kinetic chain mobility, strength, and function to safely return to their prior level of activity.    Plan  From a physical therapy perspective, the patient would benefit from: Skilled Rehab Services    Planned therapy interventions include: Therapeutic exercise, Therapeutic activities, Neuromuscular re-education, Manual therapy, and ADLs/IADLs.    Planned modalities to include: Biofeedback, Cryotherapy (cold pack), and Thermotherapy (hot pack).        Visit Frequency: 1 times Per Week for 8 Weeks.       This plan was discussed with Patient.   Discussion participants: Agreed Upon Plan of Care  Plan details: 1 time per week for 8 more weeks          Patient will continue to benefit from skilled outpatient physical therapy to address the deficits listed in the problem list box on initial evaluation, provide pt/family education and to maximize pt's level of independence in the home and community environment.     Patient's spiritual, cultural, and educational needs considered and patient agreeable to plan of care and goals.     Education  Education was done with Patient. The patient's learning style includes Demonstration and Listening. The patient Demonstrates understanding and Verbalizes understanding.         Proper exercise form, continuing HEP       Goals:   Active       Long       1.Report decreased knee pain < / = 2/10  to increase tolerance for returning to jogging  (Met)       Start:  02/21/25    Expected End:  04/18/25    Resolved:  03/25/25         2.Patient goal: eturn to jogging and crossfit with no knee pain   (Progressing)       Start:  02/21/25     Expected End:  06/18/25            3.Increase strength to >/= 4+/5 in hip abduction to increase tolerance for ADL and work activities.  (Progressing)       Start:  02/21/25    Expected End:  06/18/25            4. Pt will report at CJ level (20-40% impaired) on FOTO knee to demonstrate increase in LE function with every day tasks.  (Progressing)       Start:  02/21/25    Expected End:  06/18/25              Resolved       Short       1.Report decreased knee pain  < / =  4/10  to increase tolerance for stairs at work  (Met)       Start:  02/21/25    Expected End:  03/21/25    Resolved:  03/12/25         2. Increase knee ROM to opposite knee in order to be able to perform ADLs without difficulty.  (Met)       Start:  02/21/25    Expected End:  03/21/25    Resolved:  03/25/25         3. Increase strength by 1/3 MMT grade in knee ext  to increase tolerance for ADL and work activities. (Met)       Start:  02/21/25    Expected End:  03/21/25    Resolved:  03/25/25          4. Pt to tolerate HEP to improve ROM and independence with ADL's  (Met)       Start:  02/21/25    Expected End:  03/21/25    Resolved:  03/25/25             Filiberto Connors, PT, DPT     Good

## 2025-04-30 ENCOUNTER — CLINICAL SUPPORT (OUTPATIENT)
Dept: REHABILITATION | Facility: HOSPITAL | Age: 35
End: 2025-04-30
Payer: COMMERCIAL

## 2025-04-30 DIAGNOSIS — Z74.09 DECREASED FUNCTIONAL MOBILITY AND ENDURANCE: ICD-10-CM

## 2025-04-30 DIAGNOSIS — R29.3 POSTURAL IMBALANCE: Primary | ICD-10-CM

## 2025-04-30 PROCEDURE — 97140 MANUAL THERAPY 1/> REGIONS: CPT

## 2025-04-30 PROCEDURE — 97110 THERAPEUTIC EXERCISES: CPT

## 2025-04-30 PROCEDURE — 97530 THERAPEUTIC ACTIVITIES: CPT

## 2025-04-30 NOTE — PROGRESS NOTES
"  Outpatient Rehab    Physical Therapy Visit    Patient Name: Sania Sage  MRN: [id number]  YOB: 1990  Encounter Date: 4/30/2025    Therapy Diagnosis:   Encounter Diagnoses   Name Primary?    Postural imbalance Yes    Decreased functional mobility and endurance      Physician: Jonny Rowley MD    Physician Orders: Eval and Treat  Medical Diagnosis:     Visit # / Visits Authorized:  13 / 20  Insurance Authorization Period: 2/18/2025 to 12/31/2025  Date of Evaluation:  2/18/2025   Plan of Care Certification:  2/18/2025 to 4/22/2025     PT/PTA: PT   Number of PTA visits since last PT visit:0  Time In: 1705   Time Out: 1809  Total Time: 64   Total Billable Time: 64    FOTO:  Intake Score: 66%  Survey Score 1: 79%  Survey Score 2:  %         Subjective   She is up to 4:30 minutes of jogging with 1 minute walking rest breaks now. She is doign better with some return to the gym stuff but she feels like sh eis not quite to her PLOF..  Pain reported as 0/10.      Objective    Will update at progress report unless specified        Treatment:  Therapeutic Exercise  TE 1: Bike 8 min level 2  TE 2: donkey kicks with shuttle 1 band 3x15 each leg  TE 4: Single leg RDLs 2x15 each leg 10# kb  TE 5: SL calf raises 10# kb  TE 7: jump rope 2x 2 min  Manual Therapy  MT 2: PA glides of femur- grade 3 and 4  MT 4: Lateral glides of tib fem joint  Therapeutic Activity  TA 1: sneaky lunges 4x8 on 6" box each leg 5#  TA 2: lunges with pushback 10# 4x8 each leg  TA 3: captain morgans 3x8 each    Time Entry(in minutes):  Manual Therapy Time Entry: 10  Therapeutic Activity Time Entry: 12  Therapeutic Exercise Time Entry: 42    Assessment & Plan   Assessment: Pt continued to load all of her hip and knee muscles to help with return to the gym and running while she continue to progress her return to jogging program at home. She was progressed with quad and calf strengthening otday which she tolerated well with no " increase in pain. Will progress with hip intrisic stregnthening next session as well. Continues to perform well with functional strengthening and displays fatigue at the end of the session. Will continue to progress as tolerated.  Evaluation/Treatment Tolerance: Patient tolerated treatment well    Patient will continue to benefit from skilled outpatient physical therapy to address the deficits listed in the problem list box on initial evaluation, provide pt/family education and to maximize pt's level of independence in the home and community environment.     Patient's spiritual, cultural, and educational needs considered and patient agreeable to plan of care and goals.     Education  Education was done with Patient. The patient's learning style includes Demonstration and Listening. The patient Demonstrates understanding and Verbalizes understanding.         Proper exercise form, continuing HEP       Plan: Continue POC.    Goals:   Active       Long       1.Report decreased knee pain < / = 2/10  to increase tolerance for returning to jogging  (Met)       Start:  02/21/25    Expected End:  04/18/25    Resolved:  03/25/25         2.Patient goal: eturn to jogging and crossfit with no knee pain   (Progressing)       Start:  02/21/25    Expected End:  06/18/25            3.Increase strength to >/= 4+/5 in hip abduction to increase tolerance for ADL and work activities.  (Progressing)       Start:  02/21/25    Expected End:  06/18/25            4. Pt will report at CJ level (20-40% impaired) on FOTO knee to demonstrate increase in LE function with every day tasks.  (Progressing)       Start:  02/21/25    Expected End:  06/18/25              Resolved       Short       1.Report decreased knee pain  < / =  4/10  to increase tolerance for stairs at work  (Met)       Start:  02/21/25    Expected End:  03/21/25    Resolved:  03/12/25         2. Increase knee ROM to opposite knee in order to be able to perform ADLs without  difficulty.  (Met)       Start:  02/21/25    Expected End:  03/21/25    Resolved:  03/25/25         3. Increase strength by 1/3 MMT grade in knee ext  to increase tolerance for ADL and work activities. (Met)       Start:  02/21/25    Expected End:  03/21/25    Resolved:  03/25/25          4. Pt to tolerate HEP to improve ROM and independence with ADL's  (Met)       Start:  02/21/25    Expected End:  03/21/25    Resolved:  03/25/25             Filiberto Connors, PT, DPT

## 2025-05-14 ENCOUNTER — CLINICAL SUPPORT (OUTPATIENT)
Dept: REHABILITATION | Facility: HOSPITAL | Age: 35
End: 2025-05-14
Payer: COMMERCIAL

## 2025-05-14 DIAGNOSIS — Z74.09 DECREASED FUNCTIONAL MOBILITY AND ENDURANCE: ICD-10-CM

## 2025-05-14 DIAGNOSIS — R29.3 POSTURAL IMBALANCE: Primary | ICD-10-CM

## 2025-05-14 PROCEDURE — 97110 THERAPEUTIC EXERCISES: CPT

## 2025-05-14 PROCEDURE — 97530 THERAPEUTIC ACTIVITIES: CPT

## 2025-05-14 NOTE — PROGRESS NOTES
"  Outpatient Rehab    Physical Therapy Visit    Patient Name: Sania Sage  MRN: [id number]  YOB: 1990  Encounter Date: 5/14/2025    Therapy Diagnosis:   Encounter Diagnoses   Name Primary?    Postural imbalance Yes    Decreased functional mobility and endurance      Physician: Jonny Rowley MD    Physician Orders: Eval and Treat  Medical Diagnosis:     Visit # / Visits Authorized:  14 / 20  Insurance Authorization Period: 2/18/2025 to 12/31/2025  Date of Evaluation:  2/18/2025   Plan of Care Certification:  2/18/2025 to 4/22/2025     PT/PTA: PT   Number of PTA visits since last PT visit:0  Time In: 1706   Time Out: 1805  Total Time: 59   Total Billable Time: 59    FOTO:  Intake Score: 66%  Survey Score 1: 79%  Survey Score 2: 79%         Subjective   She is able to jog for thirty minutes without pain and wants to keep jogging further at home..  Pain reported as 0/10. Knee extension    Objective    Will update at progress report unless specified        Treatment:  Therapeutic Exercise  TE 1: Bike 5 min level 2  TE 2: donkey kicks with shuttle 1.5 bands 3x8 each leg  TE 3: captain morgans 4x8  TE 4: Single leg RDLs 2x15 each leg 10# kb  TE 6: SL pretzels 2x12 each  TE 7: jump rope 2x 2 min  Therapeutic Activity  TA 1: sneaky lunges 4x8 on 6" box each leg 7.5#  TA 5: reverse nordics 4x8  TA 6: Turkish split squats 3x8 each side    Time Entry(in minutes):  Therapeutic Activity Time Entry: 15  Therapeutic Exercise Time Entry: 44    Assessment & Plan   Assessment: Pt reported today with full knee hyperextension and patellar mobility; she was progressed with quad eccentric exercises which she tolerated well with no increase in pain. She cotninues to progress well with jogging at home and is trying to go over 30 minutes as she is able to do thirty minutes with no soreness. Educated to slowly progress at abotu 5% per week and monitor pain and soreness when increasing running time. She " continues to do well with functional strengthening in the clinic as well and is making good progress towards full return to prior level of function. Will continue to progress as tolerated.  Evaluation/Treatment Tolerance: Patient tolerated treatment well    Patient will continue to benefit from skilled outpatient physical therapy to address the deficits listed in the problem list box on initial evaluation, provide pt/family education and to maximize pt's level of independence in the home and community environment.     Patient's spiritual, cultural, and educational needs considered and patient agreeable to plan of care and goals.     Education  Education was done with Patient. The patient's learning style includes Demonstration and Listening. The patient Demonstrates understanding and Verbalizes understanding.         Proper exercise form, continuing HEP         Plan: Continue POC.    Goals:   Active       Long       1.Report decreased knee pain < / = 2/10  to increase tolerance for returning to jogging  (Met)       Start:  02/21/25    Expected End:  04/18/25    Resolved:  03/25/25         2.Patient goal: eturn to jogging and crossfit with no knee pain   (Progressing)       Start:  02/21/25    Expected End:  06/18/25            3.Increase strength to >/= 4+/5 in hip abduction to increase tolerance for ADL and work activities.  (Progressing)       Start:  02/21/25    Expected End:  06/18/25            4. Pt will report at CJ level (20-40% impaired) on FOTO knee to demonstrate increase in LE function with every day tasks.  (Progressing)       Start:  02/21/25    Expected End:  06/18/25              Resolved       Short       1.Report decreased knee pain  < / =  4/10  to increase tolerance for stairs at work  (Met)       Start:  02/21/25    Expected End:  03/21/25    Resolved:  03/12/25         2. Increase knee ROM to opposite knee in order to be able to perform ADLs without difficulty.  (Met)       Start:  02/21/25     Expected End:  03/21/25    Resolved:  03/25/25         3. Increase strength by 1/3 MMT grade in knee ext  to increase tolerance for ADL and work activities. (Met)       Start:  02/21/25    Expected End:  03/21/25    Resolved:  03/25/25          4. Pt to tolerate HEP to improve ROM and independence with ADL's  (Met)       Start:  02/21/25    Expected End:  03/21/25    Resolved:  03/25/25             Filiberto Connors, PT, DPT

## 2025-05-21 ENCOUNTER — CLINICAL SUPPORT (OUTPATIENT)
Dept: REHABILITATION | Facility: HOSPITAL | Age: 35
End: 2025-05-21
Payer: COMMERCIAL

## 2025-05-21 DIAGNOSIS — Z74.09 DECREASED FUNCTIONAL MOBILITY AND ENDURANCE: ICD-10-CM

## 2025-05-21 DIAGNOSIS — R29.3 POSTURAL IMBALANCE: Primary | ICD-10-CM

## 2025-05-21 PROCEDURE — 97530 THERAPEUTIC ACTIVITIES: CPT

## 2025-05-21 PROCEDURE — 97110 THERAPEUTIC EXERCISES: CPT

## 2025-05-21 NOTE — PROGRESS NOTES
"  Outpatient Rehab    Physical Therapy Visit    Patient Name: Sania Sage  MRN: [id number]  YOB: 1990  Encounter Date: 5/21/2025    Therapy Diagnosis:   Encounter Diagnoses   Name Primary?    Postural imbalance Yes    Decreased functional mobility and endurance      Physician: Jonny Rowley MD    Physician Orders: Eval and Treat  Medical Diagnosis:     Visit # / Visits Authorized:  15 / 20  Insurance Authorization Period: 2/18/2025 to 12/31/2025  Date of Evaluation:  2/18/2025   Plan of Care Certification:  2/18/2025 to 4/22/2025     PT/PTA: PT   Number of PTA visits since last PT visit:0  Time In: 1655   Time Out: 1755  Total Time: 60   Total Billable Time: 60    FOTO:  Intake Score: 66%  Survey Score 1: 79%  Survey Score 2: 79%       Standard      Subjective   She was able to run 3 miles over the weekend with no pain.  Pain reported as 0/10.      Objective    Will update at progress report unless specified        Treatment:  Therapeutic Exercise  TE 1: Bike 6 min level 2  TE 2: donkey kicks with shuttle 1.5 bands 3x8 each leg  TE 4: Single leg RDLs 2x15 each leg 10# kb  TE 7: jump rope 2x 2 min  Therapeutic Activity  TA 1: sneaky lunges 4x8 on 6" box each leg 7.5#  TA 3: captain morgans 3x8 each  TA 4: hang cleans 2x12  TA 5: reverse nordics 4x8  TA 6: Cambodian split squats 3x8 each side  TA 7: barbell suats 45# (focused on form)3x15    Time Entry(in minutes):  Therapeutic Activity Time Entry: 40  Therapeutic Exercise Time Entry: 20    Assessment & Plan   Assessment: Pt reported today with reports of increased distance on jogging, and she is ready to start retruning to crossfit. In session, we worked on barbell squats which she was able to tolerate with no increase in knee pain. Her form needed to be modified, and she needed mod cueing to precent excess bending at the trunk avoiding hip hinging. She was able to correct it after verbal, visual, and tactile cues. She also was " progressed with cleans today which she needed min cueing to fix form as well to prevent pulling away from the body and to get under the bar. She will continue to progress with more crossfit movements as tolerated.  Evaluation/Treatment Tolerance: Patient tolerated treatment well    Patient will continue to benefit from skilled outpatient physical therapy to address the deficits listed in the problem list box on initial evaluation, provide pt/family education and to maximize pt's level of independence in the home and community environment.     Patient's spiritual, cultural, and educational needs considered and patient agreeable to plan of care and goals.     Education  Education was done with Patient. The patient's learning style includes Demonstration and Listening. The patient Demonstrates understanding and Verbalizes understanding.         Proper exercise form, continuing HEP         Plan: Continue POC.    Goals:   Active       Long       1.Report decreased knee pain < / = 2/10  to increase tolerance for returning to jogging  (Met)       Start:  02/21/25    Expected End:  04/18/25    Resolved:  03/25/25         2.Patient goal: eturn to jogging and crossfit with no knee pain   (Progressing)       Start:  02/21/25    Expected End:  06/18/25            3.Increase strength to >/= 4+/5 in hip abduction to increase tolerance for ADL and work activities.  (Progressing)       Start:  02/21/25    Expected End:  06/18/25            4. Pt will report at CJ level (20-40% impaired) on FOTO knee to demonstrate increase in LE function with every day tasks.  (Progressing)       Start:  02/21/25    Expected End:  06/18/25              Resolved       Short       1.Report decreased knee pain  < / =  4/10  to increase tolerance for stairs at work  (Met)       Start:  02/21/25    Expected End:  03/21/25    Resolved:  03/12/25         2. Increase knee ROM to opposite knee in order to be able to perform ADLs without difficulty.  (Met)        Start:  02/21/25    Expected End:  03/21/25    Resolved:  03/25/25         3. Increase strength by 1/3 MMT grade in knee ext  to increase tolerance for ADL and work activities. (Met)       Start:  02/21/25    Expected End:  03/21/25    Resolved:  03/25/25          4. Pt to tolerate HEP to improve ROM and independence with ADL's  (Met)       Start:  02/21/25    Expected End:  03/21/25    Resolved:  03/25/25             Filiberto Connors, PT, DPT

## 2025-05-28 ENCOUNTER — CLINICAL SUPPORT (OUTPATIENT)
Dept: REHABILITATION | Facility: HOSPITAL | Age: 35
End: 2025-05-28
Payer: COMMERCIAL

## 2025-05-28 DIAGNOSIS — R29.3 POSTURAL IMBALANCE: Primary | ICD-10-CM

## 2025-05-28 DIAGNOSIS — Z74.09 DECREASED FUNCTIONAL MOBILITY AND ENDURANCE: ICD-10-CM

## 2025-05-28 PROCEDURE — 97530 THERAPEUTIC ACTIVITIES: CPT

## 2025-05-28 PROCEDURE — 97110 THERAPEUTIC EXERCISES: CPT

## 2025-05-28 NOTE — PROGRESS NOTES
Outpatient Rehab    Physical Therapy Progress Note    Patient Name: Sania Sage  MRN: [id number]  YOB: 1990  Encounter Date: 5/28/2025    Therapy Diagnosis:   Encounter Diagnoses   Name Primary?    Postural imbalance Yes    Decreased functional mobility and endurance      Physician: Jonny Rowley MD    Physician Orders: Eval and Treat  Medical Diagnosis:     Visit # / Visits Authorized:  16 / 20  Insurance Authorization Period: 2/18/2025 to 12/31/2025  Date of Evaluation: 2/18/2025  Plan of Care Certification: 4/23/2025 to 6/18/2025      PT/PTA: PT   Number of PTA visits since last PT visit:0  Time In: 1700   Time Out: 1759  Total Time (in minutes): 59   Total Billable Time (in minutes): 59    FOTO:  Intake Score: 66%  Survey Score 2: 79%  Survey Score 3: 79%    Precautions:       Subjective   She tried to run the [place] 5[place] this weekend, but she was only able to run one mile due to slopes and heat causing fatigue. She did not have knee pain, but she did have faitgue due to hills..  Pain reported as 0/10.      Objective       Hip Range of Motion   Right Hip   Active (deg) Passive (deg) Pain   Flexion 115       Extension         ABduction         ADduction         External Rotation 90/90 50       External Rotation Prone         Internal Rotation 90/90   20     Internal Rotation Prone             Left Hip   Active (deg) Passive (deg) Pain   Flexion 115       Extension         ABduction         ADduction         External Rotation 90/90   50     External Rotation Prone         Internal Rotation 90/90   20     Internal Rotation Prone                  Knee Range of Motion   Right Knee   Active (deg) Passive (deg) Pain   Flexion 140 140     Extension 10 10         Left Knee   Active (deg) Passive (deg) Pain   Flexion 140 140     Extension 10 10                        Hip Strength - Planes of Motion   Right Strength Right Pain Left Strength Left  Pain   Flexion (L2) 5   5     Extension 5    5     ABduction           ADduction           Internal Rotation 5   5     External Rotation 4+   4+         Hip Strength Details  Tindeq: R: 13.3 kg, L13.1 kg; LSI= 101.5% (R/L)    Knee Strength   Right Strength Right Pain Left Strength Left  Pain   Flexion (S2)           Prone Flexion           Extension (L3)             Tindeq: Ext: R: 42.1 kg, L: 44.3 kg (LSI= 95.0% (R/L)); Flex: R: 40.2 kg, L: 43.2 kg (LSI=93.1% (R/L))           Squat Testing        Good form, no knee valgus, no pain with squatting, able to prevent femoral IR on own             Treatment:  Therapeutic Exercise  TE 1: Bike 6 min level 2  TE 7: jump rope 2x 2 min  TE 8: strength and ROM assessment  Therapeutic Activity  TA 4: hang cleans 2x12  TA 5: step downs 4x8  TA 7: barbell squats 45# (focused on form)3x15    Time Entry(in minutes):  Therapeutic Activity Time Entry: 15  Therapeutic Exercise Time Entry: 44    Assessment & Plan   Assessment: Pt was reassessed today and she showed increased hip and knee strength along with symmetrical hip and knee ROM. She displayed a 90%+ LSI in her quad, hamstring, and hip abductors. She has also shown good form and no pain with crossfit exercises. She will be discharged next visit with a HEP. She will continue to progress with more crossift exercises and strengthening exercises until next visit.  Evaluation/Treatment Tolerance: Patient tolerated treatment well    The patient will continue to benefit from skilled outpatient physical therapy in order to address the deficits listed in the problem list on the initial evaluation, provide patient and family education, and maximize the patients level of independence in the home and community environments.     The patient's spiritual, cultural, and educational needs were considered, and the patient is agreeable to the plan of care and goals.     Education  Education was done with Patient. The patient's learning style includes Demonstration and Listening. The  patient Demonstrates understanding and Verbalizes understanding.         Proper exercise form, continuing HEP       Plan: Continue POC.    Goals:   Active       Long       1.Report decreased knee pain < / = 2/10  to increase tolerance for returning to jogging  (Met)       Start:  02/21/25    Expected End:  04/18/25    Resolved:  03/25/25         2.Patient goal: eturn to jogging and crossfit with no knee pain   (Progressing)       Start:  02/21/25    Expected End:  06/18/25            3.Increase strength to >/= 4+/5 in hip abduction to increase tolerance for ADL and work activities.  (Progressing)       Start:  02/21/25    Expected End:  06/18/25            4. Pt will report at CJ level (20-40% impaired) on FOTO knee to demonstrate increase in LE function with every day tasks.  (Progressing)       Start:  02/21/25    Expected End:  06/18/25              Resolved       Short       1.Report decreased knee pain  < / =  4/10  to increase tolerance for stairs at work  (Met)       Start:  02/21/25    Expected End:  03/21/25    Resolved:  03/12/25         2. Increase knee ROM to opposite knee in order to be able to perform ADLs without difficulty.  (Met)       Start:  02/21/25    Expected End:  03/21/25    Resolved:  03/25/25         3. Increase strength by 1/3 MMT grade in knee ext  to increase tolerance for ADL and work activities. (Met)       Start:  02/21/25    Expected End:  03/21/25    Resolved:  03/25/25          4. Pt to tolerate HEP to improve ROM and independence with ADL's  (Met)       Start:  02/21/25    Expected End:  03/21/25    Resolved:  03/25/25             Filiberto Connors, PT, DPT

## 2025-06-04 ENCOUNTER — CLINICAL SUPPORT (OUTPATIENT)
Dept: REHABILITATION | Facility: HOSPITAL | Age: 35
End: 2025-06-04
Payer: COMMERCIAL

## 2025-06-04 DIAGNOSIS — Z74.09 DECREASED FUNCTIONAL MOBILITY AND ENDURANCE: ICD-10-CM

## 2025-06-04 DIAGNOSIS — R29.3 POSTURAL IMBALANCE: Primary | ICD-10-CM

## 2025-06-04 PROCEDURE — 97530 THERAPEUTIC ACTIVITIES: CPT

## 2025-06-04 PROCEDURE — 97110 THERAPEUTIC EXERCISES: CPT
